# Patient Record
Sex: FEMALE | Race: WHITE | NOT HISPANIC OR LATINO | Employment: STUDENT | URBAN - METROPOLITAN AREA
[De-identification: names, ages, dates, MRNs, and addresses within clinical notes are randomized per-mention and may not be internally consistent; named-entity substitution may affect disease eponyms.]

---

## 2023-01-24 PROCEDURE — 0W9B30Z DRAINAGE OF LEFT PLEURAL CAVITY WITH DRAINAGE DEVICE, PERCUTANEOUS APPROACH: ICD-10-PCS | Performed by: SURGERY

## 2023-01-26 ENCOUNTER — APPOINTMENT (OUTPATIENT)
Dept: GENERAL RADIOLOGY | Facility: HOSPITAL | Age: 33
DRG: 201 | End: 2023-01-26
Payer: COMMERCIAL

## 2023-01-26 ENCOUNTER — HOSPITAL ENCOUNTER (INPATIENT)
Facility: HOSPITAL | Age: 33
LOS: 5 days | Discharge: HOME | DRG: 201 | End: 2023-01-31
Attending: EMERGENCY MEDICINE | Admitting: STUDENT IN AN ORGANIZED HEALTH CARE EDUCATION/TRAINING PROGRAM
Payer: COMMERCIAL

## 2023-01-26 DIAGNOSIS — J93.9 PNEUMOTHORAX, LEFT: Primary | ICD-10-CM

## 2023-01-26 LAB
ALBUMIN SERPL-MCNC: 4.6 G/DL (ref 3.5–5.2)
ALBUMIN/GLOB SERPL: 2 G/DL
ALP SERPL-CCNC: 57 U/L (ref 39–117)
ALT SERPL W P-5'-P-CCNC: 14 U/L (ref 1–33)
ANION GAP SERPL CALCULATED.3IONS-SCNC: 9.3 MMOL/L (ref 5–15)
AST SERPL-CCNC: 15 U/L (ref 1–32)
BASOPHILS # BLD AUTO: 0.03 10*3/MM3 (ref 0–0.2)
BASOPHILS NFR BLD AUTO: 0.5 % (ref 0–1.5)
BILIRUB SERPL-MCNC: 0.2 MG/DL (ref 0–1.2)
BUN SERPL-MCNC: 11 MG/DL (ref 6–20)
BUN/CREAT SERPL: 14.3 (ref 7–25)
CALCIUM SPEC-SCNC: 9.1 MG/DL (ref 8.6–10.5)
CHLORIDE SERPL-SCNC: 106 MMOL/L (ref 98–107)
CO2 SERPL-SCNC: 24.7 MMOL/L (ref 22–29)
CREAT SERPL-MCNC: 0.77 MG/DL (ref 0.57–1)
DEPRECATED RDW RBC AUTO: 39 FL (ref 37–54)
EGFRCR SERPLBLD CKD-EPI 2021: 104.6 ML/MIN/1.73
EOSINOPHIL # BLD AUTO: 0.05 10*3/MM3 (ref 0–0.4)
EOSINOPHIL NFR BLD AUTO: 0.9 % (ref 0.3–6.2)
ERYTHROCYTE [DISTWIDTH] IN BLOOD BY AUTOMATED COUNT: 11.5 % (ref 12.3–15.4)
GLOBULIN UR ELPH-MCNC: 2.3 GM/DL
GLUCOSE SERPL-MCNC: 82 MG/DL (ref 65–99)
HCT VFR BLD AUTO: 38.1 % (ref 34–46.6)
HGB BLD-MCNC: 13.2 G/DL (ref 12–15.9)
IMM GRANULOCYTES # BLD AUTO: 0.02 10*3/MM3 (ref 0–0.05)
IMM GRANULOCYTES NFR BLD AUTO: 0.3 % (ref 0–0.5)
LYMPHOCYTES # BLD AUTO: 1.56 10*3/MM3 (ref 0.7–3.1)
LYMPHOCYTES NFR BLD AUTO: 26.9 % (ref 19.6–45.3)
MCH RBC QN AUTO: 31.7 PG (ref 26.6–33)
MCHC RBC AUTO-ENTMCNC: 34.6 G/DL (ref 31.5–35.7)
MCV RBC AUTO: 91.6 FL (ref 79–97)
MONOCYTES # BLD AUTO: 0.59 10*3/MM3 (ref 0.1–0.9)
MONOCYTES NFR BLD AUTO: 10.2 % (ref 5–12)
NEUTROPHILS NFR BLD AUTO: 3.55 10*3/MM3 (ref 1.7–7)
NEUTROPHILS NFR BLD AUTO: 61.2 % (ref 42.7–76)
NRBC BLD AUTO-RTO: 0 /100 WBC (ref 0–0.2)
PLATELET # BLD AUTO: 280 10*3/MM3 (ref 140–450)
PMV BLD AUTO: 10.4 FL (ref 6–12)
POTASSIUM SERPL-SCNC: 4 MMOL/L (ref 3.5–5.2)
PROT SERPL-MCNC: 6.9 G/DL (ref 6–8.5)
RBC # BLD AUTO: 4.16 10*6/MM3 (ref 3.77–5.28)
SODIUM SERPL-SCNC: 140 MMOL/L (ref 136–145)
WBC NRBC COR # BLD: 5.8 10*3/MM3 (ref 3.4–10.8)

## 2023-01-26 PROCEDURE — G0378 HOSPITAL OBSERVATION PER HR: HCPCS

## 2023-01-26 PROCEDURE — 71046 X-RAY EXAM CHEST 2 VIEWS: CPT

## 2023-01-26 PROCEDURE — 99285 EMERGENCY DEPT VISIT HI MDM: CPT

## 2023-01-26 PROCEDURE — 25010000002 HYDROMORPHONE PER 4 MG: Performed by: EMERGENCY MEDICINE

## 2023-01-26 PROCEDURE — 71045 X-RAY EXAM CHEST 1 VIEW: CPT

## 2023-01-26 PROCEDURE — 25010000002 HYDROMORPHONE PER 4 MG: Performed by: NURSE PRACTITIONER

## 2023-01-26 PROCEDURE — 85025 COMPLETE CBC W/AUTO DIFF WBC: CPT | Performed by: NURSE PRACTITIONER

## 2023-01-26 PROCEDURE — 25010000002 MIDAZOLAM PER 1 MG: Performed by: NURSE PRACTITIONER

## 2023-01-26 PROCEDURE — 80053 COMPREHEN METABOLIC PANEL: CPT | Performed by: NURSE PRACTITIONER

## 2023-01-26 PROCEDURE — 25010000002 ONDANSETRON PER 1 MG: Performed by: NURSE PRACTITIONER

## 2023-01-26 RX ORDER — HYDROMORPHONE HYDROCHLORIDE 1 MG/ML
0.5 INJECTION, SOLUTION INTRAMUSCULAR; INTRAVENOUS; SUBCUTANEOUS ONCE
Status: COMPLETED | OUTPATIENT
Start: 2023-01-26 | End: 2023-01-26

## 2023-01-26 RX ORDER — HYDROMORPHONE HYDROCHLORIDE 1 MG/ML
0.25 INJECTION, SOLUTION INTRAMUSCULAR; INTRAVENOUS; SUBCUTANEOUS EVERY 4 HOURS PRN
Status: DISCONTINUED | OUTPATIENT
Start: 2023-01-26 | End: 2023-01-28

## 2023-01-26 RX ORDER — TRAZODONE HYDROCHLORIDE 50 MG/1
50 TABLET ORAL NIGHTLY
COMMUNITY
Start: 2022-11-23

## 2023-01-26 RX ORDER — TRAZODONE HYDROCHLORIDE 50 MG/1
50 TABLET ORAL NIGHTLY
Status: DISCONTINUED | OUTPATIENT
Start: 2023-01-26 | End: 2023-01-31 | Stop reason: HOSPADM

## 2023-01-26 RX ORDER — SODIUM CHLORIDE 0.9 % (FLUSH) 0.9 %
10 SYRINGE (ML) INJECTION AS NEEDED
Status: DISCONTINUED | OUTPATIENT
Start: 2023-01-26 | End: 2023-01-31 | Stop reason: HOSPADM

## 2023-01-26 RX ORDER — ONDANSETRON 2 MG/ML
4 INJECTION INTRAMUSCULAR; INTRAVENOUS ONCE
Status: COMPLETED | OUTPATIENT
Start: 2023-01-26 | End: 2023-01-26

## 2023-01-26 RX ORDER — MIDAZOLAM HYDROCHLORIDE 1 MG/ML
2 INJECTION INTRAMUSCULAR; INTRAVENOUS ONCE
Status: COMPLETED | OUTPATIENT
Start: 2023-01-26 | End: 2023-01-26

## 2023-01-26 RX ORDER — VILOXAZINE HYDROCHLORIDE 200 MG/1
300 CAPSULE, EXTENDED RELEASE ORAL DAILY
COMMUNITY
Start: 2023-01-05

## 2023-01-26 RX ADMIN — HYDROMORPHONE HYDROCHLORIDE 0.5 MG: 1 INJECTION, SOLUTION INTRAMUSCULAR; INTRAVENOUS; SUBCUTANEOUS at 20:25

## 2023-01-26 RX ADMIN — HYDROMORPHONE HYDROCHLORIDE 0.25 MG: 1 INJECTION, SOLUTION INTRAMUSCULAR; INTRAVENOUS; SUBCUTANEOUS at 23:47

## 2023-01-26 RX ADMIN — ONDANSETRON 4 MG: 2 INJECTION INTRAMUSCULAR; INTRAVENOUS at 20:25

## 2023-01-26 RX ADMIN — MIDAZOLAM 2 MG: 1 INJECTION INTRAMUSCULAR; INTRAVENOUS at 20:26

## 2023-01-26 RX ADMIN — SODIUM CHLORIDE 1000 ML: 9 INJECTION, SOLUTION INTRAVENOUS at 20:53

## 2023-01-26 RX ADMIN — HYDROMORPHONE HYDROCHLORIDE 0.5 MG: 1 INJECTION, SOLUTION INTRAMUSCULAR; INTRAVENOUS; SUBCUTANEOUS at 21:21

## 2023-01-27 ENCOUNTER — APPOINTMENT (OUTPATIENT)
Dept: GENERAL RADIOLOGY | Facility: HOSPITAL | Age: 33
DRG: 201 | End: 2023-01-27
Payer: COMMERCIAL

## 2023-01-27 PROBLEM — R07.81 PLEURITIC CHEST PAIN: Status: ACTIVE | Noted: 2023-01-27

## 2023-01-27 PROBLEM — G47.00 INSOMNIA: Status: ACTIVE | Noted: 2023-01-27

## 2023-01-27 PROCEDURE — 71045 X-RAY EXAM CHEST 1 VIEW: CPT

## 2023-01-27 PROCEDURE — G0378 HOSPITAL OBSERVATION PER HR: HCPCS

## 2023-01-27 PROCEDURE — 25010000002 HYDROMORPHONE 1 MG/ML SOLUTION: Performed by: NURSE PRACTITIONER

## 2023-01-27 PROCEDURE — 99253 IP/OBS CNSLTJ NEW/EST LOW 45: CPT | Performed by: NURSE PRACTITIONER

## 2023-01-27 PROCEDURE — 25010000002 ENOXAPARIN PER 10 MG: Performed by: INTERNAL MEDICINE

## 2023-01-27 RX ORDER — NITROGLYCERIN 0.4 MG/1
0.4 TABLET SUBLINGUAL
Status: DISCONTINUED | OUTPATIENT
Start: 2023-01-27 | End: 2023-01-31 | Stop reason: HOSPADM

## 2023-01-27 RX ORDER — ONDANSETRON 2 MG/ML
4 INJECTION INTRAMUSCULAR; INTRAVENOUS EVERY 6 HOURS PRN
Status: DISCONTINUED | OUTPATIENT
Start: 2023-01-27 | End: 2023-01-31 | Stop reason: HOSPADM

## 2023-01-27 RX ORDER — SODIUM CHLORIDE 9 MG/ML
40 INJECTION, SOLUTION INTRAVENOUS AS NEEDED
Status: DISCONTINUED | OUTPATIENT
Start: 2023-01-27 | End: 2023-01-31 | Stop reason: HOSPADM

## 2023-01-27 RX ORDER — SODIUM CHLORIDE 0.9 % (FLUSH) 0.9 %
10 SYRINGE (ML) INJECTION EVERY 12 HOURS SCHEDULED
Status: DISCONTINUED | OUTPATIENT
Start: 2023-01-27 | End: 2023-01-31 | Stop reason: HOSPADM

## 2023-01-27 RX ORDER — ENOXAPARIN SODIUM 100 MG/ML
40 INJECTION SUBCUTANEOUS EVERY 24 HOURS
Status: DISCONTINUED | OUTPATIENT
Start: 2023-01-27 | End: 2023-01-31 | Stop reason: HOSPADM

## 2023-01-27 RX ORDER — OXYCODONE AND ACETAMINOPHEN 7.5; 325 MG/1; MG/1
1 TABLET ORAL EVERY 4 HOURS PRN
Status: DISCONTINUED | OUTPATIENT
Start: 2023-01-27 | End: 2023-01-28

## 2023-01-27 RX ORDER — ACETAMINOPHEN 160 MG/5ML
650 SOLUTION ORAL EVERY 4 HOURS PRN
Status: DISCONTINUED | OUTPATIENT
Start: 2023-01-27 | End: 2023-01-28

## 2023-01-27 RX ORDER — ONDANSETRON 4 MG/1
4 TABLET, FILM COATED ORAL EVERY 6 HOURS PRN
Status: DISCONTINUED | OUTPATIENT
Start: 2023-01-27 | End: 2023-01-31 | Stop reason: HOSPADM

## 2023-01-27 RX ORDER — SODIUM CHLORIDE 0.9 % (FLUSH) 0.9 %
10 SYRINGE (ML) INJECTION AS NEEDED
Status: DISCONTINUED | OUTPATIENT
Start: 2023-01-27 | End: 2023-01-31 | Stop reason: HOSPADM

## 2023-01-27 RX ORDER — ACETAMINOPHEN 325 MG/1
650 TABLET ORAL EVERY 4 HOURS PRN
Status: DISCONTINUED | OUTPATIENT
Start: 2023-01-27 | End: 2023-01-28

## 2023-01-27 RX ORDER — ACETAMINOPHEN 325 MG/1
650 TABLET ORAL EVERY 4 HOURS PRN
Start: 2023-01-27

## 2023-01-27 RX ORDER — ACETAMINOPHEN 650 MG/1
650 SUPPOSITORY RECTAL EVERY 4 HOURS PRN
Status: DISCONTINUED | OUTPATIENT
Start: 2023-01-27 | End: 2023-01-28

## 2023-01-27 RX ADMIN — Medication 10 ML: at 08:16

## 2023-01-27 RX ADMIN — OXYCODONE HYDROCHLORIDE AND ACETAMINOPHEN 1 TABLET: 7.5; 325 TABLET ORAL at 08:15

## 2023-01-27 RX ADMIN — HYDROMORPHONE HYDROCHLORIDE 0.25 MG: 1 INJECTION, SOLUTION INTRAMUSCULAR; INTRAVENOUS; SUBCUTANEOUS at 03:40

## 2023-01-27 RX ADMIN — Medication 10 ML: at 20:01

## 2023-01-27 RX ADMIN — OXYCODONE HYDROCHLORIDE AND ACETAMINOPHEN 1 TABLET: 7.5; 325 TABLET ORAL at 22:41

## 2023-01-27 RX ADMIN — TRAZODONE HYDROCHLORIDE 50 MG: 50 TABLET ORAL at 20:00

## 2023-01-27 RX ADMIN — ENOXAPARIN SODIUM 40 MG: 100 INJECTION SUBCUTANEOUS at 19:59

## 2023-01-27 NOTE — PLAN OF CARE
Problem: Adult Inpatient Plan of Care  Goal: Plan of Care Review  Outcome: Ongoing, Progressing  Flowsheets (Taken 1/27/2023 0445)  Outcome Evaluation: VSS. AO x 4. CT to -20 sx. Pain controlled with PRN IV meds. NSR  Goal: Patient-Specific Goal (Individualized)  Outcome: Ongoing, Progressing  Goal: Absence of Hospital-Acquired Illness or Injury  Outcome: Ongoing, Progressing  Intervention: Identify and Manage Fall Risk  Recent Flowsheet Documentation  Taken 1/26/2023 2354 by Renuka Martinez, RN  Safety Promotion/Fall Prevention:   activity supervised   assistive device/personal items within reach   clutter free environment maintained   fall prevention program maintained   lighting adjusted   mobility aid in reach   nonskid shoes/slippers when out of bed   safety round/check completed   toileting scheduled  Taken 1/26/2023 2254 by Renuka Martinez, RN  Safety Promotion/Fall Prevention:   activity supervised   clutter free environment maintained   assistive device/personal items within reach   fall prevention program maintained   mobility aid in reach   lighting adjusted   nonskid shoes/slippers when out of bed   safety round/check completed   toileting scheduled  Intervention: Prevent and Manage VTE (Venous Thromboembolism) Risk  Recent Flowsheet Documentation  Taken 1/26/2023 2354 by Renuka Martinez, RN  Activity Management:   activity adjusted per tolerance   activity encouraged  Taken 1/26/2023 2254 by Renuka Martinez, RN  Activity Management:   activity adjusted per tolerance   activity encouraged  Goal: Optimal Comfort and Wellbeing  Outcome: Ongoing, Progressing  Intervention: Monitor Pain and Promote Comfort  Recent Flowsheet Documentation  Taken 1/26/2023 2254 by Renuka Martinez, RN  Pain Management Interventions:   position adjusted   pillow support provided  Goal: Readiness for Transition of Care  Outcome: Ongoing, Progressing  Intervention: Mutually Develop Transition  Plan  Recent Flowsheet Documentation  Taken 1/26/2023 2247 by Renuka Martinez, RN  Equipment Currently Used at Home: none  Taken 1/26/2023 2245 by Renuka Martinez, RN  Transportation Anticipated: family or friend will provide  Patient/Family Anticipated Services at Transition: none  Patient/Family Anticipates Transition to: home with family   Goal Outcome Evaluation:              Outcome Evaluation: VSS. AO x 4. CT to -20 sx. Pain controlled with PRN IV meds. NSR

## 2023-01-27 NOTE — CONSULTS
Inpatient Thoracic Surgery Consult  Consult performed by: Faiza Valdovinos, TONA, APRN  Consult ordered by: Tamera Moffett APRN          Patient Care Team:  Provider, No Known as PCP - General  Provider, No Known as PCP - Family Medicine    Chief Complaint   Patient presents with   • Back Pain       Subjective     History of Present Illness    Ms. Sapp is a pleasant 32-year-old lady without a significant past medical history.  She presented to [facility] ER yesterday with complaints of left-sided back pain and shortness of breath after acupuncture earlier that day.  Initial imaging demonstrated left-sided pneumothorax and a chest tube was placed in the ER.  Thoracic surgery was consulted for management.      Review of Systems   Constitutional: Negative.    HENT: Negative.    Eyes: Negative.    Respiratory: Positive for shortness of breath.    Cardiovascular: Positive for chest pain.   Endocrine: Negative.    Genitourinary: Negative.    Musculoskeletal: Positive for back pain.   Skin: Negative.    Allergic/Immunologic: Negative.    Neurological: Negative.    Hematological: Negative.    Psychiatric/Behavioral: Negative.         Patient Active Problem List   Diagnosis   • Pneumothorax, left   • Insomnia   • Pleuritic chest pain     Past Medical History:   Diagnosis Date   • Kyphosis      Past Surgical History:   Procedure Laterality Date   • CYST REMOVAL      uterine cyst     History reviewed. No pertinent family history.  Social History     Socioeconomic History   • Marital status: Single   Tobacco Use   • Smoking status: Never   Vaping Use   • Vaping Use: Never used   Substance and Sexual Activity   • Alcohol use: Never   • Drug use: Never     Medications Prior to Admission   Medication Sig Dispense Refill Last Dose   • Qelbree 200 MG capsule sustained-release 24 hr Take 200 mg by mouth Daily.      • traZODone (DESYREL) 50 MG tablet Take 50 mg by mouth Every Night.        No Known  Allergies    Objective      Vital Signs  Temp:  [98.1 °F (36.7 °C)-100 °F (37.8 °C)] 98.1 °F (36.7 °C)  Heart Rate:  [] 97  Resp:  [14-18] 16  BP: ()/(58-85) 90/59    Intake & Output (last day)       01/26 0701  01/27 0700 01/27 0701  01/28 0700          Urine Unmeasured Occurrence 1 x 1 x          Physical Exam  Constitutional:       General: She is not in acute distress.     Appearance: Normal appearance.   HENT:      Head: Normocephalic and atraumatic.   Cardiovascular:      Rate and Rhythm: Normal rate.      Pulses: Normal pulses.   Pulmonary:      Effort: Pulmonary effort is normal. No respiratory distress.   Chest:      Chest wall: Tenderness (left chest tube in place) present.   Musculoskeletal:         General: Normal range of motion.      Cervical back: Normal range of motion and neck supple.   Skin:     General: Skin is warm and dry.   Neurological:      General: No focal deficit present.      Mental Status: She is alert and oriented to person, place, and time.   Psychiatric:         Mood and Affect: Mood normal.         Behavior: Behavior normal.         Results Review:    I reviewed the patient's new clinical results.  I reviewed the patient's new imaging results and agree with the interpretation.  I reviewed the patient's other test results and agree with the interpretation  Discussed with patient, RN and Dr. Villar    Imaging Results (Last 24 Hours)     Procedure Component Value Units Date/Time    XR Chest 1 View [[id number]] Resulted: 01/27/23 1208     Updated: 01/27/23 1249    XR Chest 1 View [[id number]] Collected: 01/27/23 1013     Updated: 01/27/23 1019    Narrative:      XR CHEST 1 VW-     Clinical: Follow-up left-sided pneumothorax     COMPARISON examination 1/26/2023 at 2056 hours     FINDINGS: Small bore left-sided chest tube is again demonstrated, the  tip has shifted, now directed inferiorly. The left apical pneumothorax  is larger compared to the previous examination. Approximately  6-8% at  this time.     The cardiomediastinal silhouette is stable, the right lung is clear. No  acute airspace disease has developed. The remainder is unremarkable.     This report was finalized on 1/27/2023 10:16 AM by Dr. Levon Cole M.D.       XR Chest 1 View [[id number]] Collected: 01/26/23 2121     Updated: 01/26/23 2126    Narrative:      SINGLE VIEW OF THE CHEST     HISTORY: Chest tube placement     COMPARISON: 01/26/2023     FINDINGS:  Left-sided pleural drainage catheter has been placed, with subsequent  reexpansion of the left lung. There is a residual tiny left apical  pneumothorax. Heart size is within normal limits. Right lung appears  clear.       Impression:      Interval placement of a left-sided pleural drainage catheter. Residual  tiny left apical pneumothorax noted.     This report was finalized on 1/26/2023 9:23 PM by Dr. Jacinda Hamlin M.D.       XR Chest 2 View [[id number]] Collected: 01/26/23 1922     Updated: 01/26/23 1929    Narrative:      XR CHEST 2 VW-     Clinical: Chest and back pain 32-year-old female     FINDINGS: There is a left-sided pneumothorax, estimated size 20%. There  is an apical and basilar component. No significant mediastinal shift.  Heart size within normal limits. No mediastinal or hilar mass. The right  lung is clear.     No pleural effusion nor vascular congestion seen.     CONCLUSION: Left-sided pneumothorax, estimated size 20%.     FINDINGS called Dr. Garza at the time of completion 7:30 PM.     This report was finalized on 1/26/2023 7:26 PM by Dr. Levon Cole M.D.             Lab Results:  Lab Results (last 24 hours)     Procedure Component Value Units Date/Time    Comprehensive Metabolic Panel [[id number]] Collected: 01/26/23 1958    Specimen: Blood Updated: 01/26/23 2057     Glucose 82 mg/dL      BUN 11 mg/dL      Creatinine 0.77 mg/dL      Sodium 140 mmol/L      Potassium 4.0 mmol/L      Comment: Slight hemolysis detected by analyzer. Results may be  affected.        Chloride 106 mmol/L      CO2 24.7 mmol/L      Calcium 9.1 mg/dL      Total Protein 6.9 g/dL      Albumin 4.6 g/dL      ALT (SGPT) 14 U/L      AST (SGOT) 15 U/L      Comment: Slight hemolysis detected by analyzer. Results may be affected.        Alkaline Phosphatase 57 U/L      Total Bilirubin 0.2 mg/dL      Globulin 2.3 gm/dL      A/G Ratio 2.0 g/dL      BUN/Creatinine Ratio 14.3     Anion Gap 9.3 mmol/L      eGFR 104.6 mL/min/1.73     Narrative:      GFR Normal >60  Chronic Kidney Disease <60  Kidney Failure <15      CBC & Differential [[id number]]  (Abnormal) Collected: 01/26/23 1958    Specimen: Blood Updated: 01/26/23 2022    Narrative:      The following orders were created for panel order CBC & Differential.  Procedure                               Abnormality         Status                     ---------                               -----------         ------                     CBC Auto Differential[[id number]]        Abnormal            Final result                 Please view results for these tests on the individual orders.    CBC Auto Differential [[id number]]  (Abnormal) Collected: 01/26/23 1958    Specimen: Blood Updated: 01/26/23 2022     WBC 5.80 10*3/mm3      RBC 4.16 10*6/mm3      Hemoglobin 13.2 g/dL      Hematocrit 38.1 %      MCV 91.6 fL      MCH 31.7 pg      MCHC 34.6 g/dL      RDW 11.5 %      RDW-SD 39.0 fl      MPV 10.4 fL      Platelets 280 10*3/mm3      Neutrophil % 61.2 %      Lymphocyte % 26.9 %      Monocyte % 10.2 %      Eosinophil % 0.9 %      Basophil % 0.5 %      Immature Grans % 0.3 %      Neutrophils, Absolute 3.55 10*3/mm3      Lymphocytes, Absolute 1.56 10*3/mm3      Monocytes, Absolute 0.59 10*3/mm3      Eosinophils, Absolute 0.05 10*3/mm3      Basophils, Absolute 0.03 10*3/mm3      Immature Grans, Absolute 0.02 10*3/mm3      nRBC 0.0 /100 WBC               Assessment & Plan       Pneumothorax, left    Insomnia    Pleuritic chest pain      Assessment & Plan    I  have independently reviewed the patient's radiographic imaging including chest x-ray performed pre and post chest tube placement.  Left-sided pneumothorax appears to have resolved once chest tube was placed.    Left pneumothorax: Patient received acupuncture earlier today was found to have pneumothorax on admission.  This appears resolved with chest tube placement.  No airleak with forceful cough.  Chest tube was removed at the bedside today without difficulty.  Dry DuoDERM applied to former chest tube site.  Patient may shower this evening and remove DuoDERM after 48 hours.  We will check a follow-up image at 12 noon.  Patient should be stable for discharge as long as pneumothorax has not recurred on imaging.  She may follow-up with us as needed in our clinic.  Thank you for letting us precipitate in the care of Ms. Sapp      I discussed the patients findings and our recommendations with patient, nursing staff and Dr. Villar as well as family at bedside    Thank you for this consult and allowing us to participate in the care of your patient.       Faiza Valdovinos DNP, APRN  [facility]  01/27/23  12:49 EST      I spent >45 minutes reviewing the patient's chart including medical history, notes, radiographic imaging, labs and performing an assessment and development of a plan and discussion with the patient/family at bedside.      ADDENDUM: Follow-up x-ray reviewed status post chest tube removal.  Persistent pleural separation.  Recommend to observe 1 more night and recheck a chest x-ray in the morning.

## 2023-01-27 NOTE — H&P
Patient Name:  Lena Sapp  YOB: 1990  MRN:  [id number]  Admit Date:  1/26/2023  Patient Care Team:  Provider, No Known as PCP - General  Provider, No Known as PCP - Family Medicine      Subjective   History Present Illness     Chief Complaint   Patient presents with   • Back Pain         This is a 32-year-old woman with a past medical history of ADHD and mood disorder comes to the hospital after experiencing dyspnea after undergoing acupuncture this morning.  Into the hospital for further evaluation management where an x-ray revealed a left-sided pneumothorax with an apical and basilar component.  She had a chest tube placed on the ER and was subsequently admitted.  Laboratory work-up did not reveal any abnormalities.  Denies any fevers, chills, nausea, vomiting, chest pain.        Review of Systems   Constitutional: Negative for chills and fever.   HENT: Negative for dental problem and ear pain.    Respiratory: Positive for shortness of breath. Negative for wheezing.    Cardiovascular: Negative for chest pain and palpitations.   Gastrointestinal: Negative for abdominal pain and blood in stool.   Endocrine: Negative for cold intolerance and polydipsia.   Musculoskeletal: Negative for back pain and joint swelling.   Skin: Negative for color change and rash.   Neurological: Negative for weakness and headaches.   Psychiatric/Behavioral: Negative for agitation and decreased concentration.        Personal History     Past Medical History:   Diagnosis Date   • Kyphosis      Past Surgical History:   Procedure Laterality Date   • CYST REMOVAL      uterine cyst     History reviewed. No pertinent family history.  Social History     Tobacco Use   • Smoking status: Never   Vaping Use   • Vaping Use: Never used   Substance Use Topics   • Alcohol use: Never   • Drug use: Never     No current facility-administered medications on file prior to encounter.     Current Outpatient Medications on File Prior to  Encounter   Medication Sig Dispense Refill   • Qelbree 200 MG capsule sustained-release 24 hr Take 200 mg by mouth Daily.     • traZODone (DESYREL) 50 MG tablet Take 50 mg by mouth Every Night.       No Known Allergies    Objective    Objective     Vital Signs  Temp:  [100 °F (37.8 °C)] 100 °F (37.8 °C)  Heart Rate:  [] 92  Resp:  [14-18] 16  BP: ()/(58-85) 104/78  SpO2:  [99 %-100 %] 100 %  on   ;   Device (Oxygen Therapy): room air  Body mass index is 19.97 kg/m².    Physical Exam  Constitutional:       Appearance: Normal appearance.   HENT:      Head: Normocephalic and atraumatic.   Eyes:      Extraocular Movements: Extraocular movements intact.      Pupils: Pupils are equal, round, and reactive to light.   Cardiovascular:      Rate and Rhythm: Normal rate and regular rhythm.   Pulmonary:      Effort: Pulmonary effort is normal. No respiratory distress.      Comments: Chest tube noted   Abdominal:      General: There is no distension.      Palpations: Abdomen is soft.      Tenderness: There is no abdominal tenderness.   Musculoskeletal:      Right lower leg: No edema.      Left lower leg: No edema.   Skin:     General: Skin is warm and dry.   Neurological:      General: No focal deficit present.      Mental Status: She is alert and oriented to person, place, and time.         Results Review:  I reviewed the patient's new clinical results.  I reviewed the patient's new imaging results and agree with the interpretation.  I reviewed the patient's other test results and agree with the interpretation  I personally viewed and interpreted the patient's EKG/Telemetry data  Discussed with ED provider.    Lab Results (last 24 hours)     Procedure Component Value Units Date/Time    CBC & Differential [[id number]]  (Abnormal) Collected: 01/26/23 1958    Specimen: Blood Updated: 01/26/23 2022    Narrative:      The following orders were created for panel order CBC & Differential.  Procedure                                Abnormality         Status                     ---------                               -----------         ------                     CBC Auto Differential[[id number]]        Abnormal            Final result                 Please view results for these tests on the individual orders.    Comprehensive Metabolic Panel [[id number]] Collected: 01/26/23 1958    Specimen: Blood Updated: 01/26/23 2057     Glucose 82 mg/dL      BUN 11 mg/dL      Creatinine 0.77 mg/dL      Sodium 140 mmol/L      Potassium 4.0 mmol/L      Comment: Slight hemolysis detected by analyzer. Results may be affected.        Chloride 106 mmol/L      CO2 24.7 mmol/L      Calcium 9.1 mg/dL      Total Protein 6.9 g/dL      Albumin 4.6 g/dL      ALT (SGPT) 14 U/L      AST (SGOT) 15 U/L      Comment: Slight hemolysis detected by analyzer. Results may be affected.        Alkaline Phosphatase 57 U/L      Total Bilirubin 0.2 mg/dL      Globulin 2.3 gm/dL      A/G Ratio 2.0 g/dL      BUN/Creatinine Ratio 14.3     Anion Gap 9.3 mmol/L      eGFR 104.6 mL/min/1.73     Narrative:      GFR Normal >60  Chronic Kidney Disease <60  Kidney Failure <15      CBC Auto Differential [[id number]]  (Abnormal) Collected: 01/26/23 1958    Specimen: Blood Updated: 01/26/23 2022     WBC 5.80 10*3/mm3      RBC 4.16 10*6/mm3      Hemoglobin 13.2 g/dL      Hematocrit 38.1 %      MCV 91.6 fL      MCH 31.7 pg      MCHC 34.6 g/dL      RDW 11.5 %      RDW-SD 39.0 fl      MPV 10.4 fL      Platelets 280 10*3/mm3      Neutrophil % 61.2 %      Lymphocyte % 26.9 %      Monocyte % 10.2 %      Eosinophil % 0.9 %      Basophil % 0.5 %      Immature Grans % 0.3 %      Neutrophils, Absolute 3.55 10*3/mm3      Lymphocytes, Absolute 1.56 10*3/mm3      Monocytes, Absolute 0.59 10*3/mm3      Eosinophils, Absolute 0.05 10*3/mm3      Basophils, Absolute 0.03 10*3/mm3      Immature Grans, Absolute 0.02 10*3/mm3      nRBC 0.0 /100 WBC           Imaging Results (Last 24 Hours)     Procedure Component  Value Units Date/Time    XR Chest 1 View [[id number]] Collected: 01/26/23 2121     Updated: 01/26/23 2126    Narrative:      SINGLE VIEW OF THE CHEST     HISTORY: Chest tube placement     COMPARISON: 01/26/2023     FINDINGS:  Left-sided pleural drainage catheter has been placed, with subsequent  reexpansion of the left lung. There is a residual tiny left apical  pneumothorax. Heart size is within normal limits. Right lung appears  clear.       Impression:      Interval placement of a left-sided pleural drainage catheter. Residual  tiny left apical pneumothorax noted.     This report was finalized on 1/26/2023 9:23 PM by Dr. Jacinda Hamlin M.D.       XR Chest 2 View [[id number]] Collected: 01/26/23 1922     Updated: 01/26/23 1929    Narrative:      XR CHEST 2 VW-     Clinical: Chest and back pain 32-year-old female     FINDINGS: There is a left-sided pneumothorax, estimated size 20%. There  is an apical and basilar component. No significant mediastinal shift.  Heart size within normal limits. No mediastinal or hilar mass. The right  lung is clear.     No pleural effusion nor vascular congestion seen.     CONCLUSION: Left-sided pneumothorax, estimated size 20%.     FINDINGS called Dr. Garza at the time of completion 7:30 PM.     This report was finalized on 1/26/2023 7:26 PM by Dr. Levon Cole M.D.                 No orders to display        Assessment/Plan     Active Hospital Problems    Diagnosis  POA   • **Pneumothorax, left [J93.9]  Yes      Resolved Hospital Problems   No resolved problems to display.       32-year-old woman who came to the hospital after experiencing dyspnea after acupuncture.  Diagnosed with pneumothorax and had chest tube placed.    Left-sided pneumothorax  Chest tube placed  Thoracic surgery consulted  Monitor oxygenation    Insomnia  Resume home trazodone       I discussed the patient's findings and my recommendations with patient and ED provider.    VTE Prophylaxis - SCDs.  Code Status  - Full code.       Napoleon Conteh MD  [facility]  01/26/23  22:04 EST

## 2023-01-27 NOTE — ED PROVIDER NOTES
EMERGENCY DEPARTMENT ENCOUNTER    Room Number:  05/05  Date of encounter:  1/26/2023  PCP: Provider, No Known  Patient Care Team:  Provider, No Known as PCP - General  Provider, No Known as PCP - Family Medicine   Independent Historians: Patient        PPE: Patient was placed in face mask in first look. Patient was wearing facemask when I entered the room and throughout our encounter. I wore full protective equipment throughout this patient encounter including a face mask, and gloves. Hand hygiene was performed before donning protective equipment and after removal when leaving the room.        HPI:  Chief Complaint: Left-sided back pain  A complete HPI/ROS/PMH/PSH/SH/FH are unobtainable due to: Nothing    Chronic or social conditions impacting patient care (social determinants of health): None    Context: Lena Sapp is a 32 y.o. female who arrives to the ED via private vehicle.  Patient presents with c/o left posterior mid back pain that started today after having acupuncture around 10 AM.  Patient states that driving home she noticed the pain, it progressed throughout the day.  Patient states this was the first time she had had acupuncture.  Patient also complains of shortness of breath, coughing.  Patient denies fever, chills, nausea, vomiting, dizziness, weakness.  Patient states that nothing makes the symptoms better and taking a deep breath worsens symptoms.  Last menstrual period 3 weeks ago    Review of prior external notes (non-ED): Medical records reviewed in [facility], patient last saw primary care 1/5/2021 for depression.    Review of prior external test results outside of this encounter: Patient had a COVID-19 test 6/16/2022 that was negative    PAST MEDICAL HISTORY  Active Ambulatory Problems     Diagnosis Date Noted   • No Active Ambulatory Problems     Resolved Ambulatory Problems     Diagnosis Date Noted   • No Resolved Ambulatory Problems     No Additional Past Medical History       The patient  has started, but not completed, their COVID-19 vaccination series.    PAST SURGICAL HISTORY  No past surgical history on file.      FAMILY HISTORY  No family history on file.      SOCIAL HISTORY  Social History     Socioeconomic History   • Marital status: Unknown         ALLERGIES  Patient has no known allergies.        REVIEW OF SYSTEMS  Review of Systems     All systems reviewed and negative except for those discussed in HPI.       PHYSICAL EXAM    I have reviewed the triage vital signs and nursing notes.    ED Triage Vitals [01/26/23 1906]   Temp Heart Rate Resp BP SpO2   100 °F (37.8 °C) 100 18 105/80 99 %       Physical Exam  GENERAL: Well appearing, nontoxic appearing, not distressed  HENT: normocephalic, atraumatic  EYES: no scleral icterus, PERRL  CV: regular rhythm, tachycardic, no murmur  RESPIRATORY: normal effort, diminished breath sounds on the left  ABDOMEN: soft, nontender  MUSCULOSKELETAL: no deformity  NEURO: alert, moves all extremities, follows commands, mental status normal/baseline  SKIN: warm, dry, no rash   Psych: Appropriate mood and affect  Nursing notes and vital signs reviewed          LAB RESULTS  No results found for this or any previous visit (from the past 24 hour(s)).    Ordered the above labs and independently reviewed the results.        RADIOLOGY  XR Chest 2 View    Result Date: 1/26/2023  XR CHEST 2 VW-  Clinical: Chest and back pain 32-year-old female  FINDINGS: There is a left-sided pneumothorax, estimated size 20%. There is an apical and basilar component. No significant mediastinal shift. Heart size within normal limits. No mediastinal or hilar mass. The right lung is clear.  No pleural effusion nor vascular congestion seen.  CONCLUSION: Left-sided pneumothorax, estimated size 20%.  FINDINGS called Dr. Garza at the time of completion 7:30 PM.  This report was finalized on 1/26/2023 7:26 PM by Dr. Levon Cole M.D.        I ordered the above noted radiological studies.  Reviewed by me and discussed with radiologist.  See dictation for official radiology interpretation.      PROCEDURES    Critical Care  Performed by: Tamera Moffett APRN  Authorized by: Manny Velasquez II, MD     Critical care provider statement:     Critical care time (minutes):  40    Critical care was necessary to treat or prevent imminent or life-threatening deterioration of the following conditions: left pneumothorax.    Critical care was time spent personally by me on the following activities:  Ordering and performing treatments and interventions, ordering and review of laboratory studies, development of treatment plan with patient or surrogate, discussions with consultants, ordering and review of radiographic studies, examination of patient, review of old charts and obtaining history from patient or surrogate        DIFFERENTIAL DIAGNOSIS:  Differential diagnosis include but are not limited to the following: Musculoskeletal pain, pneumothorax, pleurisy    PROGRESS, DATA ANALYSIS, CONSULTS, AND MEDICAL DECISION MAKING    All labs have been independently reviewed by me.  All radiology studies have been reviewed by me and discussed with radiologist dictating the report.   EKG's independently viewed and interpreted by me.  Discussion below represents my analysis of pertinent findings related to patient's condition, differential diagnosis, treatment plan and final disposition.        ED Course as of 01/26/23 2220 Thu Jan 26, 2023 1949 Patient is a well-appearing 32-year-old who presents today with left back pain and shortness of breath after having acupuncture done around 10 AM this morning.  Discussed with patient that her chest x-ray does show that she has a left-sided pneumothorax.  Call has been placed to thoracic surgery. [MS]   1949 I viewed the patient's chest imaging in PACS.  My interpretation is left-sided pneumothorax.  See dictation for official radiology interpretation.     [MS]    1950 Reviewed pt's history and workup with Dr. Velasquez.  After a bedside evaluation, he agrees with the plan of care.     [MS]   2004 Discussed with Dr Gardner, thoracic surgery, regarding patient's relevant history, exam, workup and ED findings/concerns.  He agrees to see patient in the am, would like for patient to get chest tube tonight.     [MS]   2051 Dr Velasquez placed a chest tube, patient tolerated well, repeat chest xray ordered for chest tube placement. [MS]   2104 Consult Note    Discussed care with Dr Gipson  Reviewed patient's history, exam, results and need for admission secondary to left pneumothorax  Dr. Gipson accepts the patient to be admitted to telemetry observation bed.     [MS]      ED Course User Index  [MS] Tamera Moffett, APRN         DISPOSITION  ED Disposition     ED Disposition   Decision to Admit    Condition   --    Comment   Level of Care: Telemetry [5]   Diagnosis: Pneumothorax, left [086765]   Admitting Physician: DELORES GIPSON [[id number]]   Attending Physician: DELORES GIPSON [[id number]]               ADMISSION    Discussed treatment plan and reason for admission with pt/family and admitting physician.  Pt/family voiced understanding of the plan for admission for further testing/treatment as needed.      DIAGNOSIS  Final diagnoses:   Pneumothorax, left       AS OF 19:46 EST VITALS:    BP - 101/68  HR - 100  TEMP - 100 °F (37.8 °C) (Tympanic)  O2 SATS - 100%      MEDICATIONS GIVEN IN ER    Medications   sodium chloride 0.9 % flush 10 mL (has no administration in time range)                Note Disclaimer: At [facility], we believe that sharing information builds trust and better relationships. You are receiving this note because you recently visited [facility]. It is possible you will see health information before a provider has talked with you about it. This kind of information can be easy to misunderstand. To help you fully understand what it means for your health, we urge you to  discuss this note with your provider.       Tamera Moffett, APRN  01/26/23 8508

## 2023-01-27 NOTE — ED PROVIDER NOTES
MD ATTESTATION NOTE    The GIOVANNI and I have discussed this patient's history, physical exam, and treatment plan.    I provided a substantive portion of the care of this patient. I personally performed the physical exam, in its entirety. The attached note describes my personal findings.      Lena Sapp is a 32 y.o. female who presents to the ED c/o left-sided chest pain and shortness of breath that began while getting in acupuncture today for the first time around 10 AM.  Pain has been progressing throughout the day.      On exam:  GENERAL: not distressed  HENT: nares patent  EYES: no scleral icterus  CV: regular rhythm, regular rate  RESPIRATORY: normal effort, bilateral breath sounds although it is diminished in the left  ABDOMEN: soft, nontender  MUSCULOSKELETAL: no deformity  NEURO: alert, moves all extremities, follows commands  SKIN: warm, dry    Labs  Recent Results (from the past 24 hour(s))   Comprehensive Metabolic Panel    Collection Time: 01/26/23  7:58 PM    Specimen: Blood   Result Value Ref Range    Glucose 82 65 - 99 mg/dL    BUN 11 6 - 20 mg/dL    Creatinine 0.77 0.57 - 1.00 mg/dL    Sodium 140 136 - 145 mmol/L    Potassium 4.0 3.5 - 5.2 mmol/L    Chloride 106 98 - 107 mmol/L    CO2 24.7 22.0 - 29.0 mmol/L    Calcium 9.1 8.6 - 10.5 mg/dL    Total Protein 6.9 6.0 - 8.5 g/dL    Albumin 4.6 3.5 - 5.2 g/dL    ALT (SGPT) 14 1 - 33 U/L    AST (SGOT) 15 1 - 32 U/L    Alkaline Phosphatase 57 39 - 117 U/L    Total Bilirubin 0.2 0.0 - 1.2 mg/dL    Globulin 2.3 gm/dL    A/G Ratio 2.0 g/dL    BUN/Creatinine Ratio 14.3 7.0 - 25.0    Anion Gap 9.3 5.0 - 15.0 mmol/L    eGFR 104.6 >60.0 mL/min/1.73   CBC Auto Differential    Collection Time: 01/26/23  7:58 PM    Specimen: Blood   Result Value Ref Range    WBC 5.80 3.40 - 10.80 10*3/mm3    RBC 4.16 3.77 - 5.28 10*6/mm3    Hemoglobin 13.2 12.0 - 15.9 g/dL    Hematocrit 38.1 34.0 - 46.6 %    MCV 91.6 79.0 - 97.0 fL    MCH 31.7 26.6 - 33.0 pg    MCHC 34.6 31.5 -  35.7 g/dL    RDW 11.5 (L) 12.3 - 15.4 %    RDW-SD 39.0 37.0 - 54.0 fl    MPV 10.4 6.0 - 12.0 fL    Platelets 280 140 - 450 10*3/mm3    Neutrophil % 61.2 42.7 - 76.0 %    Lymphocyte % 26.9 19.6 - 45.3 %    Monocyte % 10.2 5.0 - 12.0 %    Eosinophil % 0.9 0.3 - 6.2 %    Basophil % 0.5 0.0 - 1.5 %    Immature Grans % 0.3 0.0 - 0.5 %    Neutrophils, Absolute 3.55 1.70 - 7.00 10*3/mm3    Lymphocytes, Absolute 1.56 0.70 - 3.10 10*3/mm3    Monocytes, Absolute 0.59 0.10 - 0.90 10*3/mm3    Eosinophils, Absolute 0.05 0.00 - 0.40 10*3/mm3    Basophils, Absolute 0.03 0.00 - 0.20 10*3/mm3    Immature Grans, Absolute 0.02 0.00 - 0.05 10*3/mm3    nRBC 0.0 0.0 - 0.2 /100 WBC       Radiology  XR Chest 2 View    Result Date: 1/26/2023  XR CHEST 2 VW-  Clinical: Chest and back pain 32-year-old female  FINDINGS: There is a left-sided pneumothorax, estimated size 20%. There is an apical and basilar component. No significant mediastinal shift. Heart size within normal limits. No mediastinal or hilar mass. The right lung is clear.  No pleural effusion nor vascular congestion seen.  CONCLUSION: Left-sided pneumothorax, estimated size 20%.  FINDINGS called Dr. Garza at the time of completion 7:30 PM.  This report was finalized on 1/26/2023 7:26 PM by Dr. Levon Cole M.D.        Medical Decision Making:  ED Course as of 01/26/23 2107   Thu Jan 26, 2023 1949 Patient is a well-appearing 32-year-old who presents today with left back pain and shortness of breath after having acupuncture done around 10 AM this morning.  Discussed with patient that her chest x-ray does show that she has a left-sided pneumothorax.  Call has been placed to thoracic surgery. [MS]   1949 I viewed the patient's chest imaging in PACS.  My interpretation is left-sided pneumothorax.  See dictation for official radiology interpretation.     [MS]   1950 Reviewed pt's history and workup with Dr. Velasquez.  After a bedside evaluation, he agrees with the plan of care.      [MS]   2004 Discussed with Dr Gardner, thoracic surgery, regarding patient's relevant history, exam, workup and ED findings/concerns.  He agrees to see patient in the am, would like for patient to get chest tube tonight.     [MS]   2051 Dr Velasquez placed a chest tube, patient tolerated well, repeat chest xray ordered for chest tube placement. [MS]   2104 Consult Note    Discussed care with Dr Conteh  Reviewed patient's history, exam, results and need for admission secondary to left pneumothorax  Dr. Conteh accepts the patient to be admitted to telemetry observation bed.     [MS]      ED Course User Index  [MS] Zuhair Tamera RADHA, APRN       Chest Tube Insertion    Date/Time: 1/26/2023 9:08 PM  Performed by: Manny Velasquez II, MD  Authorized by: Manny Velasquez II, MD     Consent:     Consent obtained:  Written    Consent given by:  Patient    Risks discussed:  Bleeding, incomplete drainage, infection, pain and damage to surrounding structures  Pre-procedure details:     Skin preparation:  Chlorhexidine    Preparation: Patient was prepped and draped in the usual sterile fashion    Sedation:     Sedation type:  Anxiolysis  Anesthesia:     Anesthesia method:  Local infiltration    Local anesthetic:  Lidocaine 1% w/o epi  Procedure details:     Placement location:  L lateral    Scalpel size:  11    Tube size (Frisian): 14.    Tension pneumothorax: no      Tube connected to:  Suction    Drainage characteristics:  Air only    Suture material:  2-0 silk    Dressing:  4x4 sterile gauze and petrolatum-impregnated gauze  Post-procedure details:     Post-insertion x-ray findings: tube in good position      Procedure completion:  Tolerated well, no immediate complications      Chest x-ray interpreted by myself.  Left pneumothorax has improved after placement of chest tube.      PPE: Both the patient and I wore a surgical mask throughout the entire patient encounter.     Diagnosis  Final diagnoses:   Pneumothorax, left         Manny Velasquez II, MD  01/26/23 2681

## 2023-01-27 NOTE — ED NOTES
Nursing report ED to floor  Lena Sapp  32 y.o.  female    HPI :   Chief Complaint   Patient presents with    Back Pain       Admitting doctor:   Napoleon Conteh MD    Admitting diagnosis:   The encounter diagnosis was Pneumothorax, left.    Code status:   Current Code Status       Date Active Code Status Order ID Comments User Context       Not on file            Allergies:   Patient has no known allergies.    Isolation:   No active isolations    Intake and Output  No intake or output data in the 24 hours ending 01/26/23 2153    Weight:       01/26/23 1942   Weight: 54.4 kg (120 lb)       Most recent vitals:   Vitals:    01/26/23 2107 01/26/23 2109 01/26/23 2112 01/26/23 2115   BP:  112/83     BP Location:       Patient Position:       Pulse: 86 96 78 85   Resp:       Temp:       TempSrc:       SpO2: 100% 100% 100% 100%   Weight:       Height:           Active LDAs/IV Access:   Lines, Drains & Airways       Active LDAs       Name Placement date Placement time Site Days    Peripheral IV 01/26/23 1958 Right Antecubital 01/26/23 1958  Antecubital  less than 1    Chest Tube Left 01/26/23 2148  --  less than 1                    Labs (abnormal labs have a star):   Labs Reviewed   CBC WITH AUTO DIFFERENTIAL - Abnormal; Notable for the following components:       Result Value    RDW 11.5 (*)     All other components within normal limits   COMPREHENSIVE METABOLIC PANEL    Narrative:     GFR Normal >60  Chronic Kidney Disease <60  Kidney Failure <15     CBC AND DIFFERENTIAL    Narrative:     The following orders were created for panel order CBC & Differential.  Procedure                               Abnormality         Status                     ---------                               -----------         ------                     CBC Auto Differential[[id number]]        Abnormal            Final result                 Please view results for these tests on the individual orders.       EKG:   No orders to display        Meds given in ED:   Medications   sodium chloride 0.9 % flush 10 mL (has no administration in time range)   midazolam (VERSED) injection 2 mg (2 mg Intravenous Given 1/26/23 2026)   HYDROmorphone (DILAUDID) injection 0.5 mg (0.5 mg Intravenous Given 1/26/23 2025)   ondansetron (ZOFRAN) injection 4 mg (4 mg Intravenous Given 1/26/23 2025)   sodium chloride 0.9 % bolus 1,000 mL (1,000 mL Intravenous New Bag 1/26/23 2053)   HYDROmorphone (DILAUDID) injection 0.5 mg (0.5 mg Intravenous Given 1/26/23 2121)       Imaging results:  XR Chest 1 View    Result Date: 1/26/2023  Interval placement of a left-sided pleural drainage catheter. Residual tiny left apical pneumothorax noted.  This report was finalized on 1/26/2023 9:23 PM by Dr. Jacinda Hamlin M.D.       Ambulatory status:   Up ad annmarie    Social issues:   Social History     Socioeconomic History    Marital status: Single   Tobacco Use    Smoking status: Never   Vaping Use    Vaping Use: Never used   Substance and Sexual Activity    Alcohol use: Never    Drug use: Never       NIH Stroke Scale:         Portia Minor RN  01/26/23 21:53 EST

## 2023-01-27 NOTE — PROGRESS NOTES
Name: Lena Sapp ADMIT: 2023   : 1990  PCP: Provider, No Known    MRN: [id number] LOS: 0 days   AGE/SEX: 32 y.o. female  ROOM: [facility]   Subjective   Chief Complaint   Patient presents with   • Back Pain     S/p chest tube placement yesterday  Removed today   Feels well  On RA    ROS  No f/c  No n/v  No cp/palp  No soa/cough    Objective   Vital Signs  Temp:  [98.1 °F (36.7 °C)-100 °F (37.8 °C)] 98.1 °F (36.7 °C)  Heart Rate:  [] 92  Resp:  [14-18] 18  BP: ()/(58-85) 105/72  SpO2:  [97 %-100 %] 100 %  on   ;   Device (Oxygen Therapy): room air  Body mass index is 19.97 kg/m².    Physical Exam  Constitutional:       General: She is not in acute distress.  HENT:      Head: Normocephalic and atraumatic.   Eyes:      General: No scleral icterus.  Cardiovascular:      Rate and Rhythm: Regular rhythm.      Heart sounds: Normal heart sounds.   Pulmonary:      Effort: Pulmonary effort is normal. No respiratory distress.   Abdominal:      General: There is no distension.      Palpations: Abdomen is soft.   Musculoskeletal:      Cervical back: Neck supple.   Neurological:      Mental Status: She is alert.   Psychiatric:         Behavior: Behavior normal.         Results Review:       I reviewed the patient's new clinical results.  Results from last 7 days   Lab Units 23   WBC 10*3/mm3 5.80   HEMOGLOBIN g/dL 13.2   PLATELETS 10*3/mm3 280     Results from last 7 days   Lab Units 23   SODIUM mmol/L 140   POTASSIUM mmol/L 4.0   CHLORIDE mmol/L 106   CO2 mmol/L 24.7   BUN mg/dL 11   CREATININE mg/dL 0.77   GLUCOSE mg/dL 82   Estimated Creatinine Clearance: 89.2 mL/min (by C-G formula based on SCr of 0.77 mg/dL).  Results from last 7 days   Lab Units 23   ALBUMIN g/dL 4.6   BILIRUBIN mg/dL 0.2   ALK PHOS U/L 57   AST (SGOT) U/L 15   ALT (SGPT) U/L 14     Results from last 7 days   Lab Units 23   CALCIUM mg/dL 9.1   ALBUMIN g/dL 4.6         Coag      HbA1C No results found for: HGBA1C  Infection     Radiology(recent) XR Chest 1 View    Result Date: 1/26/2023  Interval placement of a left-sided pleural drainage catheter. Residual tiny left apical pneumothorax noted.  This report was finalized on 1/26/2023 9:23 PM by Dr. Jacinda Hamlin M.D.      No results found for: TROPONINT, TROPONINI, BNP  No components found for: TSH;2    enoxaparin, 40 mg, Subcutaneous, Q24H  sodium chloride, 10 mL, Intravenous, Q12H  traZODone, 50 mg, Oral, Nightly       Diet: Regular/House Diet; Texture: Regular Texture (IDDSI 7); Fluid Consistency: Thin (IDDSI 0)      Assessment & Plan      Active Hospital Problems    Diagnosis  POA   • **Pneumothorax, left [J93.9]  Yes   • Insomnia [G47.00]  Yes   • Pleuritic chest pain [R07.81]  Yes      Resolved Hospital Problems   No resolved problems to display.       · S/p CT yesterday, removed today  · Residual PTX on xray today  · PRN agents for pain  · Home trazodone  · D/W Thoracic Surgery service. With persistent PTX on imaging this afternoon will keep in hospital and repeat CXR in AM per their recomendations      DW RN  DW Faiza Valdovinos DNP, APRN  DW family    Nuno Castellano MD  [facility]  01/27/23  18:34 EST

## 2023-01-27 NOTE — PLAN OF CARE
Problem: Pain Acute  Goal: Acceptable Pain Control and Functional Ability  Outcome: Ongoing, Progressing  Intervention: Develop Pain Management Plan  Recent Flowsheet Documentation  Taken 1/27/2023 0815 by Yelena Jay, RN  Pain Management Interventions: see MAR  Intervention: Optimize Psychosocial Wellbeing  Recent Flowsheet Documentation  Taken 1/27/2023 0815 by Yelena Jay, RN  Diversional Activities:   television   smartphone   Goal Outcome Evaluation:  Plan of Care Reviewed With: patient, mother        Progress: no change  Outcome Evaluation: VSS. Up ad annmarie. Discharge home tomorrow pending CXR. Will monitor.

## 2023-01-27 NOTE — PROGRESS NOTES
Progress note    The chest tube that was placed in ER last night resulted in resolution of the pneumothorax.  However, the chest tube was noted to be shifted on the morning x-ray.  There was small apical pneumothorax.  The tube was not in communication with the pleural cavity and the water column was not tidaling as well.  Therefore the chest tube was removed.    Repeat chest x-ray revealed slight increase in the pneumothorax.  I recommend she stay in the hospital overnight for observation.    If she has increasing pneumothorax, I will plan for chest placement.    Jj Villar MD  Thoracic Surgeon

## 2023-01-28 ENCOUNTER — APPOINTMENT (OUTPATIENT)
Dept: GENERAL RADIOLOGY | Facility: HOSPITAL | Age: 33
DRG: 201 | End: 2023-01-28
Payer: COMMERCIAL

## 2023-01-28 ENCOUNTER — APPOINTMENT (OUTPATIENT)
Dept: CT IMAGING | Facility: HOSPITAL | Age: 33
DRG: 201 | End: 2023-01-28
Payer: COMMERCIAL

## 2023-01-28 PROCEDURE — 0 LIDOCAINE 1 % SOLUTION: Performed by: SURGERY

## 2023-01-28 PROCEDURE — 25010000002 ENOXAPARIN PER 10 MG: Performed by: INTERNAL MEDICINE

## 2023-01-28 PROCEDURE — 25010000002 HYDROMORPHONE PER 4 MG: Performed by: NURSE PRACTITIONER

## 2023-01-28 PROCEDURE — 71045 X-RAY EXAM CHEST 1 VIEW: CPT

## 2023-01-28 PROCEDURE — 25010000002 MORPHINE PER 10 MG: Performed by: NURSE PRACTITIONER

## 2023-01-28 PROCEDURE — 71250 CT THORAX DX C-: CPT

## 2023-01-28 PROCEDURE — 99024 POSTOP FOLLOW-UP VISIT: CPT | Performed by: NURSE PRACTITIONER

## 2023-01-28 PROCEDURE — 0W9B30Z DRAINAGE OF LEFT PLEURAL CAVITY WITH DRAINAGE DEVICE, PERCUTANEOUS APPROACH: ICD-10-PCS | Performed by: SURGERY

## 2023-01-28 RX ORDER — DIAZEPAM 2 MG/1
2 TABLET ORAL EVERY 6 HOURS PRN
Status: DISCONTINUED | OUTPATIENT
Start: 2023-01-28 | End: 2023-01-31 | Stop reason: HOSPADM

## 2023-01-28 RX ORDER — LIDOCAINE HYDROCHLORIDE 10 MG/ML
30 INJECTION, SOLUTION INFILTRATION; PERINEURAL ONCE
Status: DISCONTINUED | OUTPATIENT
Start: 2023-01-28 | End: 2023-01-28

## 2023-01-28 RX ORDER — OXYCODONE HYDROCHLORIDE 5 MG/1
5 TABLET ORAL EVERY 4 HOURS PRN
Status: DISCONTINUED | OUTPATIENT
Start: 2023-01-28 | End: 2023-01-31 | Stop reason: HOSPADM

## 2023-01-28 RX ORDER — MORPHINE SULFATE 2 MG/ML
4 INJECTION, SOLUTION INTRAMUSCULAR; INTRAVENOUS EVERY 4 HOURS PRN
Status: DISCONTINUED | OUTPATIENT
Start: 2023-01-28 | End: 2023-01-31 | Stop reason: HOSPADM

## 2023-01-28 RX ORDER — LIDOCAINE HYDROCHLORIDE 10 MG/ML
30 INJECTION, SOLUTION INFILTRATION; PERINEURAL ONCE
Status: COMPLETED | OUTPATIENT
Start: 2023-01-28 | End: 2023-01-28

## 2023-01-28 RX ORDER — HYDROMORPHONE HYDROCHLORIDE 1 MG/ML
0.5 INJECTION, SOLUTION INTRAMUSCULAR; INTRAVENOUS; SUBCUTANEOUS
Status: DISCONTINUED | OUTPATIENT
Start: 2023-01-28 | End: 2023-01-28

## 2023-01-28 RX ORDER — GABAPENTIN 300 MG/1
300 CAPSULE ORAL 3 TIMES DAILY
Status: DISCONTINUED | OUTPATIENT
Start: 2023-01-28 | End: 2023-01-31 | Stop reason: HOSPADM

## 2023-01-28 RX ORDER — ACETAMINOPHEN 500 MG
1000 TABLET ORAL 3 TIMES DAILY
Status: DISCONTINUED | OUTPATIENT
Start: 2023-01-28 | End: 2023-01-31 | Stop reason: HOSPADM

## 2023-01-28 RX ADMIN — OXYCODONE HYDROCHLORIDE 5 MG: 5 TABLET ORAL at 10:32

## 2023-01-28 RX ADMIN — LIDOCAINE HYDROCHLORIDE 30 ML: 10 INJECTION, SOLUTION INFILTRATION; PERINEURAL at 10:20

## 2023-01-28 RX ADMIN — GABAPENTIN 300 MG: 300 CAPSULE ORAL at 20:19

## 2023-01-28 RX ADMIN — TRAZODONE HYDROCHLORIDE 50 MG: 50 TABLET ORAL at 20:19

## 2023-01-28 RX ADMIN — OXYCODONE HYDROCHLORIDE 5 MG: 5 TABLET ORAL at 18:59

## 2023-01-28 RX ADMIN — Medication 10 ML: at 09:26

## 2023-01-28 RX ADMIN — GABAPENTIN 300 MG: 300 CAPSULE ORAL at 10:32

## 2023-01-28 RX ADMIN — GABAPENTIN 300 MG: 300 CAPSULE ORAL at 17:07

## 2023-01-28 RX ADMIN — HYDROMORPHONE HYDROCHLORIDE 0.25 MG: 1 INJECTION, SOLUTION INTRAMUSCULAR; INTRAVENOUS; SUBCUTANEOUS at 09:57

## 2023-01-28 RX ADMIN — OXYCODONE HYDROCHLORIDE 5 MG: 5 TABLET ORAL at 22:58

## 2023-01-28 RX ADMIN — ACETAMINOPHEN 1000 MG: 500 TABLET, FILM COATED ORAL at 17:07

## 2023-01-28 RX ADMIN — Medication 10 ML: at 20:19

## 2023-01-28 RX ADMIN — OXYCODONE HYDROCHLORIDE 5 MG: 5 TABLET ORAL at 14:39

## 2023-01-28 RX ADMIN — ENOXAPARIN SODIUM 40 MG: 100 INJECTION SUBCUTANEOUS at 18:59

## 2023-01-28 RX ADMIN — ACETAMINOPHEN 1000 MG: 500 TABLET, FILM COATED ORAL at 20:19

## 2023-01-28 RX ADMIN — ACETAMINOPHEN 1000 MG: 500 TABLET, FILM COATED ORAL at 09:57

## 2023-01-28 RX ADMIN — MORPHINE SULFATE 4 MG: 2 INJECTION, SOLUTION INTRAMUSCULAR; INTRAVENOUS at 15:22

## 2023-01-28 NOTE — PROGRESS NOTES
Name: Lena Sapp ADMIT: 2023   : 1990  PCP: Provider, No Known    MRN: [id number] LOS: 0 days   AGE/SEX: 32 y.o. female  ROOM: 60     Subjective   Subjective        Patient is lying in bed does not appear to be any distress.  No new events overnight.  Denies nausea, vomiting abdominal pain.  Does complain of chest pain secondary to chest tube placement on the left side.       Objective   Objective   Vital Signs  Temp:  [98.1 °F (36.7 °C)-98.8 °F (37.1 °C)] 98.5 °F (36.9 °C)  Heart Rate:  [] 104  Resp:  [16-18] 18  BP: (104-126)/(68-82) 126/82  SpO2:  [97 %-100 %] 99 %  on   ;      Body mass index is 19.97 kg/m².  Physical Exam  Constitutional:       General: She is in acute distress.   HENT:      Head: Normocephalic and atraumatic.      Mouth/Throat:      Mouth: Mucous membranes are moist.   Eyes:      Extraocular Movements: Extraocular movements intact.      Conjunctiva/sclera: Conjunctivae normal.      Pupils: Pupils are equal, round, and reactive to light.   Cardiovascular:      Rate and Rhythm: Normal rate and regular rhythm.      Pulses: Normal pulses.      Heart sounds: Normal heart sounds.   Pulmonary:      Effort: No respiratory distress.      Breath sounds: Normal breath sounds.      Comments: Diminished breath sounds on the left, chest tube noted on the left side  Abdominal:      General: Bowel sounds are normal. There is no distension.      Palpations: Abdomen is soft.      Tenderness: There is no abdominal tenderness.   Musculoskeletal:      Cervical back: Normal range of motion and neck supple.      Right lower leg: No edema.      Left lower leg: No edema.   Skin:     General: Skin is warm and dry.   Neurological:      General: No focal deficit present.      Mental Status: She is alert and oriented to person, place, and time.   Psychiatric:         Mood and Affect: Mood normal.         Behavior: Behavior normal.       Results Review     I reviewed the patient's new  clinical results.  Results from last 7 days   Lab Units 01/26/23 1958   WBC 10*3/mm3 5.80   HEMOGLOBIN g/dL 13.2   PLATELETS 10*3/mm3 280     Results from last 7 days   Lab Units 01/26/23 1958   SODIUM mmol/L 140   POTASSIUM mmol/L 4.0   CHLORIDE mmol/L 106   CO2 mmol/L 24.7   BUN mg/dL 11   CREATININE mg/dL 0.77   GLUCOSE mg/dL 82   EGFR mL/min/1.73 104.6     Results from last 7 days   Lab Units 01/26/23 1958   ALBUMIN g/dL 4.6   BILIRUBIN mg/dL 0.2   ALK PHOS U/L 57   AST (SGOT) U/L 15   ALT (SGPT) U/L 14     Results from last 7 days   Lab Units 01/26/23 1958   CALCIUM mg/dL 9.1   ALBUMIN g/dL 4.6       No results found for: HGBA1C, POCGLU    XR Chest 1 View    Result Date: 1/28/2023  Chest tube placement with marked decrease of previous left pneumothorax.  This report was finalized on 1/28/2023 10:44 AM by Dr. Elia Becker M.D.      XR Chest 1 View    Result Date: 1/28/2023  Further interval increase in left pneumothorax.  Discussed by telephone with patient's nurse, John, at time of interpretation, 0617, 01/28/2023.  This report was finalized on 1/28/2023 6:23 AM by Dr. Elia Becker M.D.      XR Chest 1 View    Result Date: 1/26/2023  Interval placement of a left-sided pleural drainage catheter. Residual tiny left apical pneumothorax noted.  This report was finalized on 1/26/2023 9:23 PM by Dr. Jacinda Hamlin M.D.      I have personally reviewed all medications:  Scheduled Medications  acetaminophen, 1,000 mg, Oral, TID  enoxaparin, 40 mg, Subcutaneous, Q24H  gabapentin, 300 mg, Oral, TID  sodium chloride, 10 mL, Intravenous, Q12H  traZODone, 50 mg, Oral, Nightly    Infusions   Diet  Diet: Regular/House Diet; Texture: Regular Texture (IDDSI 7); Fluid Consistency: Thin (IDDSI 0)    I have personally reviewed:  [x]  Laboratory   [x]  Microbiology   [x]  Radiology   [x]  EKG/Telemetry  [x]  Cardiology/Vascular   [x]  Pathology    [x]  Records       Assessment/Plan     Active Hospital Problems     Diagnosis  POA   • **Pneumothorax, left [J93.9]  Yes   • Insomnia [G47.00]  Yes   • Pleuritic chest pain [R07.81]  Yes      Resolved Hospital Problems   No resolved problems to display.       32 y.o. female admitted with Pneumothorax, left.      1. Left-sided pneumothorax status post acute puncture , chest tube was initially discontinued/ removed yesterday and repeat chest x-ray today revealed worsening pneumothorax therefore underwent placement of chest tube again by CT surgery.  Continue with analgesics.  2. On Lovenox for DVT prophylaxis.    3. Code status is full code.      Julio William MD  [facility]  01/28/23  13:11 EST

## 2023-01-28 NOTE — PROGRESS NOTES
"    Chief Complaint: Left pneumothorax  S/P: Chest tube placement  POD # 0    Subjective:  Symptoms:  Worsening.  She reports shortness of breath and chest pain.    Diet:  Adequate intake.    Activity level: Impaired due to pain.    Pain:  She complains of pain that is moderate.  Pain is partially controlled.        Vital Signs:  Temp:  [98.1 °F (36.7 °C)-98.8 °F (37.1 °C)] 98.5 °F (36.9 °C)  Heart Rate:  [89-95] 94  Resp:  [16-18] 18  BP: (104-116)/(68-72) 104/70    Intake & Output (last day)       01/27 0701  01/28 0700 01/28 0701  01/29 0700    P.O. 740     Total Intake(mL/kg) 740 (13.6)     Net +740           Urine Unmeasured Occurrence 5 x           Objective:  General Appearance:  Uncomfortable, well-appearing and in no acute distress.    Vital signs: (most recent): Blood pressure 104/70, pulse 94, temperature 98.5 °F (36.9 °C), temperature source Oral, resp. rate 18, height 165.1 cm (65\"), weight 54.4 kg (120 lb), last menstrual period 12/28/2022, SpO2 97 %.  Vital signs are normal.    Output: Producing urine.    HEENT: Normal HEENT exam.    Lungs:  Normal effort.  She is not in respiratory distress.  There are decreased breath sounds.    Heart: Normal rate.    Abdomen: Abdomen is soft.    Extremities: Normal range of motion.    Pulses: Distal pulses are intact.    Neurological: Patient is alert and oriented to person, place and time.    Pupils:  Pupils are equal, round, and reactive to light.    Skin:  Warm.              Chest tube:   Site: Left, Clean, Dry, Intact and Securement device intact  Suction: -20 cm  Air Leak: negative    Results Review:     I reviewed the patient's new clinical results.  I reviewed the patient's new imaging results and agree with the interpretation.  I reviewed the patient's other test results and agree with the interpretation  Discussed with patient, mother at bedside and Dr. Villar    Imaging Results (Last 24 Hours)     Procedure Component Value Units Date/Time    XR Chest 1 View " [[id number]] Collected: 01/28/23 1043     Updated: 01/28/23 1047    Narrative:      XR CHEST 1 VW-     HISTORY: Female who is 32 years-old,  chest tube placement     TECHNIQUE: Frontal view of the chest     COMPARISON: 01/28/2023     FINDINGS: Left chest tube is seen at the left base. Heart, mediastinum  and pulmonary vasculature are unremarkable. The left lung has  reexpanded, and left pneumothorax is markedly decreased, now measuring  about 2 mm at the apex. No focal pulmonary consolidation, pleural  effusion, or right pneumothorax. No acute osseous process.       Impression:      Chest tube placement with marked decrease of previous left  pneumothorax.     This report was finalized on 1/28/2023 10:44 AM by Dr. Elia Becker M.D.       XR Chest 1 View [[id number]] Collected: 01/28/23 0618     Updated: 01/28/23 0626    Narrative:      XR CHEST 1 VW-     HISTORY: Female who is 32 years-old,  left pneumothorax     TECHNIQUE: Frontal view of the chest     COMPARISON: 01/27/2023     FINDINGS: Heart, mediastinum and pulmonary vasculature are unremarkable.  Previous left pneumothorax shows interval increase, peripherally  measuring as much as 2.1 cm near the base, and 2.4 cm at the upper  hemithorax. At the apex, the pneumothorax measures about 3.5 cm.  Estimated 25-30% left pneumothorax. Mild atelectasis is seen in the left  lung. No pleural effusion, or right pneumothorax. No acute osseous  process.       Impression:      Further interval increase in left pneumothorax.     Discussed by telephone with patient's nurse, John, at time of  interpretation, 0617, 01/28/2023.     This report was finalized on 1/28/2023 6:23 AM by Dr. Elia Becker M.D.       XR Chest 1 View [[id number]] Collected: 01/27/23 1319     Updated: 01/27/23 1323    Narrative:      XR CHEST 1 VW-     Chemical: Chest tube removal     COMPARISON 1/27/2023 at 0933 hours, current examination 1218 hours     FINDINGS: The left-sided pneumothorax  demonstrates interval enlargement.  The left-sided chest tube has been removed. Distance between the pleural  reflection and apex is currently 3.8 cm compared to 2 cm previously.     No mediastinal shift, the right lung remains clear. No pleural fusion.     CONCLUSION: Enlarging left pneumothorax status post chest tube removal.     This report was finalized on 1/27/2023 1:20 PM by Dr. Levon Cole M.D.             Lab Results:     Lab Results (last 24 hours)     ** No results found for the last 24 hours. **           Assessment & Plan       Pneumothorax, left    Insomnia    Pleuritic chest pain       Assessment & Plan    I have independently reviewed the chest x-ray performed this morning prior to chest tube placement which demonstrates interval increase in pleural sided pneumothorax.    Left pneumothorax: Patient had acupuncture on 1/26/2023 with increased shortness of breath and chest pain.  Chest x-ray in the ER showed pneumothorax and chest tube was placed.  This appeared resolved on imaging yesterday and chest tube was removed at the bedside.  Follow-up imaging demonstrated increased pleural separation and a subsequent chest tube was placed at the bedside today by Dr. Villar.  Follow-up imaging is again stable with adequate aeration and expansion of the left lung.  We will leave her chest tube to -20 cm of suction and recheck a chest x-ray in the morning.  A CT of the chest without contrast has also been ordered for further evaluation.      Faiza Valdovinos DNP, APRN  [facility]  01/28/23  11:45 EST

## 2023-01-28 NOTE — PROCEDURES
Chest Tube Insertion Procedure Note    Indications:  Clinically significant Pneumothorax    Pre-operative Diagnosis: Iatrogenic left pneumothorax  Post-operative Diagnosis: Iatrogenic left pneumothorax    Procedure Details   Informed consent was obtained for the procedure, including sedation.  Risks of lung perforation, hemorrhage, arrhythmia, and adverse drug reaction were discussed.     After sterile skin prep, using standard technique, a 14 Polish tube was placed in the left lateral 6th rib space.    Findings:  Gush of air upon entry to the chest cavity    Estimated Blood Loss:  Minimal           Specimens: * Cannot find log *                Complications:  None; patient tolerated the procedure well.           Disposition: PACU - hemodynamically stable.           Condition: stable    Attending Attestation: I performed the procedure.

## 2023-01-28 NOTE — PLAN OF CARE
Goal Outcome Evaluation:  Plan of Care Reviewed With: patient     VSS. Pain to L chest/back treated with prn oxy. Up ad annmarie. D/c home today pending cxr results.     Progress: improving

## 2023-01-28 NOTE — PLAN OF CARE
Problem: Adult Inpatient Plan of Care  Goal: Plan of Care Review  Outcome: Ongoing, Progressing  Flowsheets  Taken 1/28/2023 1852 by Donna Hubbard RN  Plan of Care Reviewed With:   patient   mother  Outcome Evaluation: patient had chest tube placed at bedside. patient tolerated procedure well and pain medication given throughout shift staggering dosing. patient reports improved pain level by end of shift. patient up in room and up to chair.  Taken 1/28/2023 0403 by Nohelia Niño RN  Progress: improving  Goal: Patient-Specific Goal (Individualized)  Outcome: Ongoing, Progressing  Goal: Absence of Hospital-Acquired Illness or Injury  Outcome: Ongoing, Progressing  Intervention: Identify and Manage Fall Risk  Recent Flowsheet Documentation  Taken 1/28/2023 1647 by Donna Hubbard RN  Safety Promotion/Fall Prevention: safety round/check completed  Taken 1/28/2023 1439 by Donna Hubbard RN  Safety Promotion/Fall Prevention:   activity supervised   assistive device/personal items within reach   clutter free environment maintained   fall prevention program maintained   nonskid shoes/slippers when out of bed   room organization consistent   safety round/check completed  Taken 1/28/2023 1032 by Donna Hubbard RN  Safety Promotion/Fall Prevention: safety round/check completed  Taken 1/28/2023 0800 by Donna Hubbard RN  Safety Promotion/Fall Prevention:   activity supervised   assistive device/personal items within reach   clutter free environment maintained   fall prevention program maintained   nonskid shoes/slippers when out of bed   room organization consistent   safety round/check completed  Intervention: Prevent Skin Injury  Recent Flowsheet Documentation  Taken 1/28/2023 1647 by Donna Hubbard RN  Body Position:   position changed independently   patient/family refused  Taken 1/28/2023 1439 by Donna Hubbard RN  Body Position:   position changed independently   patient/family refused  Skin  Protection: adhesive use limited  Taken 1/28/2023 1032 by Donna Hubbard RN  Body Position:   position changed independently   patient/family refused  Taken 1/28/2023 0800 by Donna Hubbard RN  Body Position:   position changed independently   patient/family refused  Skin Protection: adhesive use limited  Intervention: Prevent and Manage VTE (Venous Thromboembolism) Risk  Recent Flowsheet Documentation  Taken 1/28/2023 1647 by Donna Hubbard RN  Activity Management: activity adjusted per tolerance  Taken 1/28/2023 1439 by Donna Hubbard RN  Activity Management:   activity adjusted per tolerance   activity encouraged  VTE Prevention/Management: (Lovenox) --  Range of Motion: active ROM (range of motion) encouraged  Taken 1/28/2023 1032 by Donna Hubbard RN  Activity Management: activity adjusted per tolerance  Taken 1/28/2023 0800 by Donna Hubbard RN  Activity Management:   activity adjusted per tolerance   activity encouraged  Range of Motion: active ROM (range of motion) encouraged  Intervention: Prevent Infection  Recent Flowsheet Documentation  Taken 1/28/2023 0800 by Donna Hubbard RN  Infection Prevention: hand hygiene promoted  Goal: Optimal Comfort and Wellbeing  Outcome: Ongoing, Progressing  Intervention: Monitor Pain and Promote Comfort  Recent Flowsheet Documentation  Taken 1/28/2023 1647 by Donna Hubbard RN  Pain Management Interventions:   position adjusted   pillow support provided   care clustered  Taken 1/28/2023 1439 by Donna Hubbard RN  Pain Management Interventions: see MAR  Taken 1/28/2023 1032 by Donna Hubbard RN  Pain Management Interventions: see MAR  Taken 1/28/2023 0800 by Donna Hubbard RN  Pain Management Interventions: care clustered  Intervention: Provide Person-Centered Care  Recent Flowsheet Documentation  Taken 1/28/2023 1439 by Donna Hubbard RN  Trust Relationship/Rapport:   care explained   choices provided   questions answered   reassurance  provided   thoughts/feelings acknowledged  Taken 1/28/2023 0800 by Donna Hubbard RN  Trust Relationship/Rapport:   care explained   choices provided   questions answered   thoughts/feelings acknowledged   reassurance provided  Goal: Readiness for Transition of Care  Outcome: Ongoing, Progressing     Problem: Pain Acute  Goal: Acceptable Pain Control and Functional Ability  Outcome: Ongoing, Progressing  Intervention: Prevent or Manage Pain  Recent Flowsheet Documentation  Taken 1/28/2023 1647 by Donna Hubbard RN  Medication Review/Management: medications reviewed  Taken 1/28/2023 1439 by Donna Hubbard RN  Medication Review/Management: medications reviewed  Taken 1/28/2023 0800 by Donna Hubbard RN  Medication Review/Management: medications reviewed  Intervention: Develop Pain Management Plan  Recent Flowsheet Documentation  Taken 1/28/2023 1647 by Donna Hubbard RN  Pain Management Interventions:   position adjusted   pillow support provided   care clustered  Taken 1/28/2023 1439 by Donna Hubbard RN  Pain Management Interventions: see MAR  Taken 1/28/2023 1032 by Donna Hubbard RN  Pain Management Interventions: see MAR  Taken 1/28/2023 0800 by Donna Hubbard RN  Pain Management Interventions: care clustered  Intervention: Optimize Psychosocial Wellbeing  Recent Flowsheet Documentation  Taken 1/28/2023 1439 by Donna Hubbard RN  Diversional Activities:   smartphone   television  Taken 1/28/2023 0800 by Donna Hubbard RN  Diversional Activities:   smartphone   television   Goal Outcome Evaluation:  Plan of Care Reviewed With: patient, mother           Outcome Evaluation: patient had chest tube placed at bedside. patient tolerated procedure well and pain medication given throughout shift staggering dosing. patient reports improved pain level by end of shift. patient up in room and up to chair.

## 2023-01-29 ENCOUNTER — APPOINTMENT (OUTPATIENT)
Dept: GENERAL RADIOLOGY | Facility: HOSPITAL | Age: 33
DRG: 201 | End: 2023-01-29
Payer: COMMERCIAL

## 2023-01-29 LAB
ANION GAP SERPL CALCULATED.3IONS-SCNC: 7 MMOL/L (ref 5–15)
BASOPHILS # BLD AUTO: 0.02 10*3/MM3 (ref 0–0.2)
BASOPHILS NFR BLD AUTO: 0.4 % (ref 0–1.5)
BUN SERPL-MCNC: 7 MG/DL (ref 6–20)
BUN/CREAT SERPL: 9.7 (ref 7–25)
CALCIUM SPEC-SCNC: 8.8 MG/DL (ref 8.6–10.5)
CHLORIDE SERPL-SCNC: 107 MMOL/L (ref 98–107)
CO2 SERPL-SCNC: 26 MMOL/L (ref 22–29)
CREAT SERPL-MCNC: 0.72 MG/DL (ref 0.57–1)
DEPRECATED RDW RBC AUTO: 39.9 FL (ref 37–54)
EGFRCR SERPLBLD CKD-EPI 2021: 113.4 ML/MIN/1.73
EOSINOPHIL # BLD AUTO: 0.14 10*3/MM3 (ref 0–0.4)
EOSINOPHIL NFR BLD AUTO: 2.9 % (ref 0.3–6.2)
ERYTHROCYTE [DISTWIDTH] IN BLOOD BY AUTOMATED COUNT: 11.5 % (ref 12.3–15.4)
GLUCOSE SERPL-MCNC: 97 MG/DL (ref 65–99)
HCT VFR BLD AUTO: 38.7 % (ref 34–46.6)
HGB BLD-MCNC: 13.1 G/DL (ref 12–15.9)
IMM GRANULOCYTES # BLD AUTO: 0.01 10*3/MM3 (ref 0–0.05)
IMM GRANULOCYTES NFR BLD AUTO: 0.2 % (ref 0–0.5)
LYMPHOCYTES # BLD AUTO: 2.25 10*3/MM3 (ref 0.7–3.1)
LYMPHOCYTES NFR BLD AUTO: 46.4 % (ref 19.6–45.3)
MCH RBC QN AUTO: 32.3 PG (ref 26.6–33)
MCHC RBC AUTO-ENTMCNC: 33.9 G/DL (ref 31.5–35.7)
MCV RBC AUTO: 95.3 FL (ref 79–97)
MONOCYTES # BLD AUTO: 0.59 10*3/MM3 (ref 0.1–0.9)
MONOCYTES NFR BLD AUTO: 12.2 % (ref 5–12)
NEUTROPHILS NFR BLD AUTO: 1.84 10*3/MM3 (ref 1.7–7)
NEUTROPHILS NFR BLD AUTO: 37.9 % (ref 42.7–76)
NRBC BLD AUTO-RTO: 0 /100 WBC (ref 0–0.2)
PLATELET # BLD AUTO: 283 10*3/MM3 (ref 140–450)
PMV BLD AUTO: 10.2 FL (ref 6–12)
POTASSIUM SERPL-SCNC: 4.1 MMOL/L (ref 3.5–5.2)
RBC # BLD AUTO: 4.06 10*6/MM3 (ref 3.77–5.28)
SODIUM SERPL-SCNC: 140 MMOL/L (ref 136–145)
WBC NRBC COR # BLD: 4.85 10*3/MM3 (ref 3.4–10.8)

## 2023-01-29 PROCEDURE — 25010000002 MORPHINE PER 10 MG: Performed by: NURSE PRACTITIONER

## 2023-01-29 PROCEDURE — 99232 SBSQ HOSP IP/OBS MODERATE 35: CPT | Performed by: SURGERY

## 2023-01-29 PROCEDURE — 85025 COMPLETE CBC W/AUTO DIFF WBC: CPT | Performed by: INTERNAL MEDICINE

## 2023-01-29 PROCEDURE — 25010000002 ENOXAPARIN PER 10 MG: Performed by: INTERNAL MEDICINE

## 2023-01-29 PROCEDURE — 71045 X-RAY EXAM CHEST 1 VIEW: CPT

## 2023-01-29 PROCEDURE — 80048 BASIC METABOLIC PNL TOTAL CA: CPT | Performed by: INTERNAL MEDICINE

## 2023-01-29 RX ORDER — BISACODYL 10 MG
10 SUPPOSITORY, RECTAL RECTAL DAILY PRN
Status: DISCONTINUED | OUTPATIENT
Start: 2023-01-29 | End: 2023-01-31 | Stop reason: HOSPADM

## 2023-01-29 RX ORDER — POLYETHYLENE GLYCOL 3350 17 G/17G
17 POWDER, FOR SOLUTION ORAL EVERY 12 HOURS PRN
Status: DISCONTINUED | OUTPATIENT
Start: 2023-01-29 | End: 2023-01-31 | Stop reason: HOSPADM

## 2023-01-29 RX ADMIN — Medication 10 ML: at 20:27

## 2023-01-29 RX ADMIN — GABAPENTIN 300 MG: 300 CAPSULE ORAL at 08:54

## 2023-01-29 RX ADMIN — OXYCODONE HYDROCHLORIDE 5 MG: 5 TABLET ORAL at 08:54

## 2023-01-29 RX ADMIN — ACETAMINOPHEN 1000 MG: 500 TABLET, FILM COATED ORAL at 16:49

## 2023-01-29 RX ADMIN — OXYCODONE HYDROCHLORIDE 5 MG: 5 TABLET ORAL at 17:58

## 2023-01-29 RX ADMIN — OXYCODONE HYDROCHLORIDE 5 MG: 5 TABLET ORAL at 21:58

## 2023-01-29 RX ADMIN — MORPHINE SULFATE 4 MG: 2 INJECTION, SOLUTION INTRAMUSCULAR; INTRAVENOUS at 05:58

## 2023-01-29 RX ADMIN — Medication 10 ML: at 08:55

## 2023-01-29 RX ADMIN — ENOXAPARIN SODIUM 40 MG: 100 INJECTION SUBCUTANEOUS at 20:27

## 2023-01-29 RX ADMIN — OXYCODONE HYDROCHLORIDE 5 MG: 5 TABLET ORAL at 13:35

## 2023-01-29 RX ADMIN — OXYCODONE HYDROCHLORIDE 5 MG: 5 TABLET ORAL at 03:52

## 2023-01-29 RX ADMIN — ACETAMINOPHEN 1000 MG: 500 TABLET, FILM COATED ORAL at 20:26

## 2023-01-29 RX ADMIN — GABAPENTIN 300 MG: 300 CAPSULE ORAL at 20:26

## 2023-01-29 RX ADMIN — TRAZODONE HYDROCHLORIDE 50 MG: 50 TABLET ORAL at 20:26

## 2023-01-29 RX ADMIN — POLYETHYLENE GLYCOL 3350 17 G: 17 POWDER, FOR SOLUTION ORAL at 20:36

## 2023-01-29 RX ADMIN — ACETAMINOPHEN 1000 MG: 500 TABLET, FILM COATED ORAL at 08:54

## 2023-01-29 RX ADMIN — GABAPENTIN 300 MG: 300 CAPSULE ORAL at 16:49

## 2023-01-29 NOTE — PLAN OF CARE
Problem: Adult Inpatient Plan of Care  Goal: Plan of Care Review  Outcome: Ongoing, Progressing  Flowsheets  Taken 1/29/2023 1806 by Donna Hubbard RN  Outcome Evaluation: patient ambulated in room and jefferson, patient chest tube to waterseal and tolerated well. plan is to clamp chest tube on 1/30. patient with c/o pain and treated with prn pain medication. patient states pain is much better controlled.  Taken 1/28/2023 1852 by Donna Hubbard RN  Plan of Care Reviewed With:   patient   mother  Taken 1/28/2023 0403 by Nohelia Niño RN  Progress: improving  Goal: Patient-Specific Goal (Individualized)  Outcome: Ongoing, Progressing  Goal: Absence of Hospital-Acquired Illness or Injury  Outcome: Ongoing, Progressing  Intervention: Identify and Manage Fall Risk  Recent Flowsheet Documentation  Taken 1/29/2023 1800 by Donna Hubbard RN  Safety Promotion/Fall Prevention: safety round/check completed  Taken 1/29/2023 1400 by Donna Hubbard RN  Safety Promotion/Fall Prevention:   activity supervised   assistive device/personal items within reach   clutter free environment maintained   fall prevention program maintained   nonskid shoes/slippers when out of bed   room organization consistent   safety round/check completed  Taken 1/29/2023 1215 by Donna Hubbard RN  Safety Promotion/Fall Prevention: safety round/check completed  Taken 1/29/2023 1036 by Donna Hubbard RN  Safety Promotion/Fall Prevention: safety round/check completed  Taken 1/29/2023 0845 by Donna Hubbard RN  Safety Promotion/Fall Prevention:   activity supervised   assistive device/personal items within reach   clutter free environment maintained   fall prevention program maintained   nonskid shoes/slippers when out of bed   room organization consistent   safety round/check completed  Intervention: Prevent Skin Injury  Recent Flowsheet Documentation  Taken 1/29/2023 1800 by Donna Hubbard RN  Body Position:   position changed  independently   patient/family refused  Taken 1/29/2023 1400 by Donna Hubbard RN  Skin Protection: adhesive use limited  Taken 1/29/2023 1215 by Donna Hubbard RN  Body Position:   position changed independently   patient/family refused  Taken 1/29/2023 1036 by Donna Hubbard RN  Body Position:   position changed independently   patient/family refused  Taken 1/29/2023 0845 by Donna Hubbard RN  Body Position:   position changed independently   patient/family refused  Skin Protection: adhesive use limited  Intervention: Prevent and Manage VTE (Venous Thromboembolism) Risk  Recent Flowsheet Documentation  Taken 1/29/2023 1800 by Donna Hubbard RN  Activity Management: activity adjusted per tolerance  Taken 1/29/2023 1400 by Donna Hubbard RN  VTE Prevention/Management: (Lovenox) other (see comments)  Range of Motion: active ROM (range of motion) encouraged  Taken 1/29/2023 1215 by Donna Hubbard RN  Activity Management: activity adjusted per tolerance  Taken 1/29/2023 1036 by Donna Hubbard RN  Activity Management: activity adjusted per tolerance  Taken 1/29/2023 0845 by Donna Hubbard RN  Activity Management: activity adjusted per tolerance  VTE Prevention/Management: (Lovenox) other (see comments)  Range of Motion: active ROM (range of motion) encouraged  Goal: Optimal Comfort and Wellbeing  Outcome: Ongoing, Progressing  Intervention: Monitor Pain and Promote Comfort  Recent Flowsheet Documentation  Taken 1/29/2023 1800 by Donna Hubbard RN  Pain Management Interventions: see MAR  Taken 1/29/2023 1036 by Donna Hubbard RN  Pain Management Interventions: no interventions per patient request  Taken 1/29/2023 0845 by Donna Hubbard RN  Pain Management Interventions: see MAR  Intervention: Provide Person-Centered Care  Recent Flowsheet Documentation  Taken 1/29/2023 1400 by Donna Hubbard RN  Trust Relationship/Rapport:   care explained   choices provided   questions answered    reassurance provided   thoughts/feelings acknowledged  Taken 1/29/2023 0845 by Donna Hubbard RN  Trust Relationship/Rapport:   care explained   choices provided   questions answered   thoughts/feelings acknowledged  Goal: Readiness for Transition of Care  Outcome: Ongoing, Progressing     Problem: Pain Acute  Goal: Acceptable Pain Control and Functional Ability  Outcome: Ongoing, Progressing  Intervention: Prevent or Manage Pain  Recent Flowsheet Documentation  Taken 1/29/2023 1800 by Donna Hubbard RN  Medication Review/Management: medications reviewed  Intervention: Develop Pain Management Plan  Recent Flowsheet Documentation  Taken 1/29/2023 1800 by Donna Hubbard RN  Pain Management Interventions: see MAR  Taken 1/29/2023 1036 by Donna Hubbard RN  Pain Management Interventions: no interventions per patient request  Taken 1/29/2023 0845 by Donna Hubbard RN  Pain Management Interventions: see MAR  Intervention: Optimize Psychosocial Wellbeing  Recent Flowsheet Documentation  Taken 1/29/2023 1400 by Donna Hubbard RN  Diversional Activities:   games   [employer]   smartphone   television  Taken 1/29/2023 0845 by Donna Hubbard RN  Diversional Activities: (art)   television   games   smartphone   other (see comments)   Goal Outcome Evaluation:  Plan of Care Reviewed With: patient, mother           Outcome Evaluation: patient ambulated in room and jefferson, patient chest tube to waterseal and tolerated well. plan is to clamp chest tube on 1/30. patient with c/o pain and treated with prn pain medication. patient states pain is much better controlled.

## 2023-01-29 NOTE — PROGRESS NOTES
Name: Lena Sapp ADMIT: 2023   : 1990  PCP: Provider, No Known    MRN: [id number] LOS: 1 days   AGE/SEX: 32 y.o. female  ROOM: [facility]/     Subjective   Subjective      Patient is sitting up on the bed and has no specific complaints.  Her chest tube insertion site pain is reasonably well controlled.  Denies nausea, vomiting, palpitations, dizziness.       Objective   Objective   Vital Signs  Temp:  [98 °F (36.7 °C)-98.9 °F (37.2 °C)] 98.3 °F (36.8 °C)  Heart Rate:  [] 96  Resp:  [16-18] 16  BP: (107-135)/(60-81) 135/79  SpO2:  [95 %-99 %] 97 %  on   ;   Device (Oxygen Therapy): room air  Body mass index is 19.97 kg/m².  Physical Exam  Constitutional:       General: She is in acute distress.   HENT:      Head: Normocephalic and atraumatic.      Mouth/Throat:      Mouth: Mucous membranes are moist.   Eyes:      Extraocular Movements: Extraocular movements intact.      Conjunctiva/sclera: Conjunctivae normal.      Pupils: Pupils are equal, round, and reactive to light.   Cardiovascular:      Rate and Rhythm: Normal rate and regular rhythm.      Pulses: Normal pulses.      Heart sounds: Normal heart sounds.   Pulmonary:      Effort: No respiratory distress.      Breath sounds: Normal breath sounds.      Comments: Diminished breath sounds on the left, chest tube noted on the left side  Abdominal:      General: Bowel sounds are normal. There is no distension.      Palpations: Abdomen is soft.      Tenderness: There is no abdominal tenderness.   Musculoskeletal:      Cervical back: Normal range of motion and neck supple.      Right lower leg: No edema.      Left lower leg: No edema.   Skin:     General: Skin is warm and dry.   Neurological:      General: No focal deficit present.      Mental Status: She is alert and oriented to person, place, and time.   Psychiatric:         Mood and Affect: Mood normal.         Behavior: Behavior normal.       Results Review     I reviewed the patient's new  clinical results.  Results from last 7 days   Lab Units 01/29/23  0400 01/26/23 1958   WBC 10*3/mm3 4.85 5.80   HEMOGLOBIN g/dL 13.1 13.2   PLATELETS 10*3/mm3 283 280     Results from last 7 days   Lab Units 01/29/23  0400 01/26/23 1958   SODIUM mmol/L 140 140   POTASSIUM mmol/L 4.1 4.0   CHLORIDE mmol/L 107 106   CO2 mmol/L 26.0 24.7   BUN mg/dL 7 11   CREATININE mg/dL 0.72 0.77   GLUCOSE mg/dL 97 82   EGFR mL/min/1.73 113.4 104.6     Results from last 7 days   Lab Units 01/26/23 1958   ALBUMIN g/dL 4.6   BILIRUBIN mg/dL 0.2   ALK PHOS U/L 57   AST (SGOT) U/L 15   ALT (SGPT) U/L 14     Results from last 7 days   Lab Units 01/29/23 0400 01/26/23 1958   CALCIUM mg/dL 8.8 9.1   ALBUMIN g/dL  --  4.6       No results found for: HGBA1C, POCGLU    CT Chest Without Contrast Diagnostic    Result Date: 1/28/2023  Small left pneumothorax with left chest tube in place. No pulmonary parenchymal abnormality or evidence for bleb or lung mass.  Radiation dose reduction techniques were utilized, including automated exposure control and exposure modulation based on body size.  This report was finalized on 1/28/2023 10:34 PM by Dr. Perico Bai M.D.      XR Chest 1 View    Result Date: 1/29/2023  Previous left pneumothorax appears mostly resolved, with trace residual left apical pneumothorax.  This report was finalized on 1/29/2023 5:58 AM by Dr. Elia Becker M.D.      XR Chest 1 View    Result Date: 1/28/2023  Chest tube placement with marked decrease of previous left pneumothorax.  This report was finalized on 1/28/2023 10:44 AM by Dr. Elia Becker M.D.      XR Chest 1 View    Result Date: 1/28/2023  Further interval increase in left pneumothorax.  Discussed by telephone with patient's nurse, John, at time of interpretation, 0617, 01/28/2023.  This report was finalized on 1/28/2023 6:23 AM by Dr. Elia Becker M.D.      I have personally reviewed all medications:  Scheduled Medications  acetaminophen,  1,000 mg, Oral, TID  enoxaparin, 40 mg, Subcutaneous, Q24H  gabapentin, 300 mg, Oral, TID  sodium chloride, 10 mL, Intravenous, Q12H  traZODone, 50 mg, Oral, Nightly    Infusions   Diet  Diet: Regular/House Diet; Texture: Regular Texture (IDDSI 7); Fluid Consistency: Thin (IDDSI 0)    I have personally reviewed:  [x]  Laboratory   [x]  Microbiology   [x]  Radiology   [x]  EKG/Telemetry  [x]  Cardiology/Vascular   [x]  Pathology    [x]  Records       Assessment/Plan     Active Hospital Problems    Diagnosis  POA   • **Pneumothorax, left [J93.9]  Yes   • Insomnia [G47.00]  Yes   • Pleuritic chest pain [R07.81]  Yes      Resolved Hospital Problems   No resolved problems to display.       32 y.o. female admitted with Pneumothorax, left.      1. Left-sided pneumothorax status post acute puncture , chest tube was initially discontinued on 1/27/2023  and repeat chest x-ray on 1/28/2023 revealed worsening pneumothorax therefore underwent placement of chest tube again by CT surgery.  Continue with analgesics.     2. On Lovenox for DVT prophylaxis.      3. Code status is full code.    Copied text on this note has been reviewed by me on 1/29/2023    Julio William MD  [facility]  01/29/23  15:31 EST

## 2023-01-29 NOTE — PROGRESS NOTES
"    Chief Complaint: Left pneumothorax  S/P: Chest tube placement  POD # 1    Subjective:  Symptoms:  Worsening.  She reports shortness of breath and chest pain.    Diet:  Adequate intake.    Activity level: Impaired due to pain.    Pain:  She complains of pain that is moderate.  Pain is partially controlled.        Vital Signs:  Temp:  [98 °F (36.7 °C)-99 °F (37.2 °C)] 98.4 °F (36.9 °C)  Heart Rate:  [] 96  Resp:  [16-18] 16  BP: (107-131)/(60-82) 107/60    Intake & Output (last day)       01/28 0701  01/29 0700 01/29 0701  01/30 0700    P.O. 960     Total Intake(mL/kg) 960 (17.6)     Urine (mL/kg/hr) 0 (0)     Chest Tube 19     Total Output 19     Net +941           Urine Unmeasured Occurrence 2 x           Objective:  General Appearance:  Uncomfortable, well-appearing and in no acute distress.    Vital signs: (most recent): Blood pressure 107/60, pulse 96, temperature 98.4 °F (36.9 °C), temperature source Oral, resp. rate 16, height 165.1 cm (65\"), weight 54.4 kg (120 lb), last menstrual period 12/28/2022, SpO2 95 %.  Vital signs are normal.    Output: Producing urine.    HEENT: Normal HEENT exam.    Lungs:  Normal effort.  She is not in respiratory distress.  There are decreased breath sounds.    Heart: Normal rate.    Abdomen: Abdomen is soft.    Extremities: Normal range of motion.    Pulses: Distal pulses are intact.    Neurological: Patient is alert and oriented to person, place and time.    Pupils:  Pupils are equal, round, and reactive to light.    Skin:  Warm.              Chest tube:   Site: Left, Clean, Dry, Intact and Securement device intact  Suction: -20 cm  Air Leak: negative    Results Review:     I reviewed the patient's new clinical results.  I reviewed the patient's new imaging results and agree with the interpretation.  I reviewed the patient's other test results and agree with the interpretation    Imaging Results (Last 24 Hours)     Procedure Component Value Units Date/Time    XR Chest " 1 View [[id number]] Collected: 01/29/23 0555     Updated: 01/29/23 0601    Narrative:      XR CHEST 1 VW-     HISTORY: Female who is 32 years-old,  left pneumothorax, follow-up     TECHNIQUE: Echogenicity of the chest     COMPARISON: 01/28/2023     FINDINGS: Left chest tube appears stable. Heart, mediastinum and  pulmonary vasculature are unremarkable. Previous left pneumothorax  appears mostly resolved, with trace residual left apical pneumothorax.  No focal pulmonary consolidation, pleural effusion, or right  pneumothorax. No acute osseous process.       Impression:      Previous left pneumothorax appears mostly resolved, with trace residual  left apical pneumothorax.      This report was finalized on 1/29/2023 5:58 AM by Dr. Elia Becker M.D.       CT Chest Without Contrast Diagnostic [[id number]] Collected: 01/28/23 2213     Updated: 01/28/23 2237    Narrative:      CT chest without contrast     HISTORY: 32-year-old female with left-sided pneumothorax. Assess for  pulmonary blebs.     TECHNIQUE: CT chest with axial imaging from the thoracic inlet to the  upper abdomen without IV contrast     COMPARISON: [facility] chest x-ray 1/20/2023. No previous chest CT for  comparison.     FINDINGS: There is a left chest drain with pigtail loop anterior to the  lingula. There is a small pneumothorax is demonstrated in the  subpulmonic region courses anterior to the lingula, left upper lobe, and  extends in the left apex. No pulmonary parenchymal abnormality or bleb  is demonstrated. There is no evidence for lung mass or suspicious  nodule. No pleural effusion is evident.     There is no evidence for mediastinal or hilar or axillary lizet  enlargement evaluation limited without IV contrast. No endotracheal or  central intrabronchial lesion is demonstrated. Imaging through the upper  abdomen appears within normal limits.       Impression:      Small left pneumothorax with left chest tube in place. No pulmonary  parenchymal  abnormality or evidence for bleb or lung mass.     Radiation dose reduction techniques were utilized, including automated  exposure control and exposure modulation based on body size.     This report was finalized on 1/28/2023 10:34 PM by Dr. Perico Bai M.D.       XR Chest 1 View [[id number]] Collected: 01/28/23 1043     Updated: 01/28/23 1047    Narrative:      XR CHEST 1 VW-     HISTORY: Female who is 32 years-old,  chest tube placement     TECHNIQUE: Frontal view of the chest     COMPARISON: 01/28/2023     FINDINGS: Left chest tube is seen at the left base. Heart, mediastinum  and pulmonary vasculature are unremarkable. The left lung has  reexpanded, and left pneumothorax is markedly decreased, now measuring  about 2 mm at the apex. No focal pulmonary consolidation, pleural  effusion, or right pneumothorax. No acute osseous process.       Impression:      Chest tube placement with marked decrease of previous left  pneumothorax.     This report was finalized on 1/28/2023 10:44 AM by Dr. Elia Becker M.D.             Lab Results:     Lab Results (last 24 hours)     Procedure Component Value Units Date/Time    Basic Metabolic Panel [[id number]]  (Normal) Collected: 01/29/23 0400    Specimen: Blood Updated: 01/29/23 0446     Glucose 97 mg/dL      BUN 7 mg/dL      Creatinine 0.72 mg/dL      Sodium 140 mmol/L      Potassium 4.1 mmol/L      Chloride 107 mmol/L      CO2 26.0 mmol/L      Calcium 8.8 mg/dL      BUN/Creatinine Ratio 9.7     Anion Gap 7.0 mmol/L      eGFR 113.4 mL/min/1.73     Narrative:      GFR Normal >60  Chronic Kidney Disease <60  Kidney Failure <15      CBC & Differential [[id number]]  (Abnormal) Collected: 01/29/23 0400    Specimen: Blood Updated: 01/29/23 0429    Narrative:      The following orders were created for panel order CBC & Differential.  Procedure                               Abnormality         Status                     ---------                               -----------          ------                     CBC Auto Differential[[id number]]        Abnormal            Final result                 Please view results for these tests on the individual orders.    CBC Auto Differential [[id number]]  (Abnormal) Collected: 01/29/23 0400    Specimen: Blood Updated: 01/29/23 0429     WBC 4.85 10*3/mm3      RBC 4.06 10*6/mm3      Hemoglobin 13.1 g/dL      Hematocrit 38.7 %      MCV 95.3 fL      MCH 32.3 pg      MCHC 33.9 g/dL      RDW 11.5 %      RDW-SD 39.9 fl      MPV 10.2 fL      Platelets 283 10*3/mm3      Neutrophil % 37.9 %      Lymphocyte % 46.4 %      Monocyte % 12.2 %      Eosinophil % 2.9 %      Basophil % 0.4 %      Immature Grans % 0.2 %      Neutrophils, Absolute 1.84 10*3/mm3      Lymphocytes, Absolute 2.25 10*3/mm3      Monocytes, Absolute 0.59 10*3/mm3      Eosinophils, Absolute 0.14 10*3/mm3      Basophils, Absolute 0.02 10*3/mm3      Immature Grans, Absolute 0.01 10*3/mm3      nRBC 0.0 /100 WBC            Assessment & Plan       Pneumothorax, left    Insomnia    Pleuritic chest pain       Assessment & Plan    Left pneumothorax: Patient had acupuncture on 1/26/2023 with increased shortness of breath and chest pain.  Chest x-ray in the ER showed pneumothorax and chest tube was placed.  This appeared resolved on imaging and chest tube was removed at the bedside on 1/27/2023.  Follow-up imaging demonstrated increased pleural separation and a subsequent chest tube was placed at the bedside on 1/28/2023.  Follow-up imaging was again stable with adequate aeration and expansion of the left lung.  CT chest did not reveal pulmonary bleb disease.  She had tiny apical pneumothorax that was also appreciated on today's x-ray.  She does not have air leak on examination.  Chest tube placed to waterseal today.    We will plan to clamp the chest tube tomorrow.  Likely home in 1 to 2 days.    Jj Villar MD  [facility]  01/29/23  09:53 EST

## 2023-01-30 ENCOUNTER — APPOINTMENT (OUTPATIENT)
Dept: GENERAL RADIOLOGY | Facility: HOSPITAL | Age: 33
DRG: 201 | End: 2023-01-30
Payer: COMMERCIAL

## 2023-01-30 PROBLEM — K59.03 DRUG-INDUCED CONSTIPATION: Status: ACTIVE | Noted: 2023-01-30

## 2023-01-30 PROCEDURE — 71045 X-RAY EXAM CHEST 1 VIEW: CPT

## 2023-01-30 PROCEDURE — 99232 SBSQ HOSP IP/OBS MODERATE 35: CPT | Performed by: NURSE PRACTITIONER

## 2023-01-30 PROCEDURE — 25010000002 ENOXAPARIN PER 10 MG: Performed by: INTERNAL MEDICINE

## 2023-01-30 RX ORDER — POLYETHYLENE GLYCOL 3350 17 G/17G
17 POWDER, FOR SOLUTION ORAL DAILY
Status: DISCONTINUED | OUTPATIENT
Start: 2023-01-31 | End: 2023-01-31 | Stop reason: HOSPADM

## 2023-01-30 RX ORDER — AMOXICILLIN 250 MG
1 CAPSULE ORAL 2 TIMES DAILY
Status: DISCONTINUED | OUTPATIENT
Start: 2023-01-30 | End: 2023-01-31 | Stop reason: HOSPADM

## 2023-01-30 RX ADMIN — Medication 10 ML: at 20:38

## 2023-01-30 RX ADMIN — GABAPENTIN 300 MG: 300 CAPSULE ORAL at 16:00

## 2023-01-30 RX ADMIN — ACETAMINOPHEN 1000 MG: 500 TABLET, FILM COATED ORAL at 20:37

## 2023-01-30 RX ADMIN — DOCUSATE SODIUM 50MG AND SENNOSIDES 8.6MG 1 TABLET: 8.6; 5 TABLET, FILM COATED ORAL at 20:37

## 2023-01-30 RX ADMIN — OXYCODONE HYDROCHLORIDE 5 MG: 5 TABLET ORAL at 02:33

## 2023-01-30 RX ADMIN — DOCUSATE SODIUM 50MG AND SENNOSIDES 8.6MG 1 TABLET: 8.6; 5 TABLET, FILM COATED ORAL at 12:32

## 2023-01-30 RX ADMIN — GABAPENTIN 300 MG: 300 CAPSULE ORAL at 20:37

## 2023-01-30 RX ADMIN — ACETAMINOPHEN 1000 MG: 500 TABLET, FILM COATED ORAL at 16:00

## 2023-01-30 RX ADMIN — OXYCODONE HYDROCHLORIDE 5 MG: 5 TABLET ORAL at 18:10

## 2023-01-30 RX ADMIN — DIAZEPAM 2 MG: 2 TABLET ORAL at 16:01

## 2023-01-30 RX ADMIN — ENOXAPARIN SODIUM 40 MG: 100 INJECTION SUBCUTANEOUS at 20:37

## 2023-01-30 RX ADMIN — GABAPENTIN 300 MG: 300 CAPSULE ORAL at 08:24

## 2023-01-30 RX ADMIN — Medication 10 ML: at 08:24

## 2023-01-30 RX ADMIN — ACETAMINOPHEN 1000 MG: 500 TABLET, FILM COATED ORAL at 08:24

## 2023-01-30 RX ADMIN — OXYCODONE HYDROCHLORIDE 5 MG: 5 TABLET ORAL at 22:26

## 2023-01-30 RX ADMIN — DIAZEPAM 2 MG: 2 TABLET ORAL at 08:24

## 2023-01-30 RX ADMIN — POLYETHYLENE GLYCOL 3350 17 G: 17 POWDER, FOR SOLUTION ORAL at 08:27

## 2023-01-30 RX ADMIN — TRAZODONE HYDROCHLORIDE 50 MG: 50 TABLET ORAL at 22:26

## 2023-01-30 NOTE — CASE MANAGEMENT/SOCIAL WORK
Discharge Planning Assessment  [facility]     Patient Name: Lena Sapp  MRN: [id number]  Today's Date: 1/30/2023    Admit Date: 1/26/2023    Plan: Home   Discharge Needs Assessment     Row Name 01/30/23 1258       Living Environment    Current Living Arrangements home    Primary Care Provided by self    Provides Primary Care For no one    Family Caregiver if Needed parent(s)    Quality of Family Relationships supportive       Resource/Environmental Concerns    Resource/Environmental Concerns none       Transition Planning    Patient/Family Anticipates Transition to home with family    Patient/Family Anticipated Services at Transition none       Discharge Needs Assessment    Equipment Currently Used at Home none    Concerns to be Addressed no discharge needs identified    Equipment Needed After Discharge none               Discharge Plan     Row Name 01/30/23 1300       Plan    Plan Home    Patient/Family in Agreement with Plan yes    Plan Comments Met with pt at bedside. Itnroduced self, explained CCP role, facesheet verified. Pt states she is independent with ADLs.  No history of DME, HH, or SNF.  Pt states she does not have PCP currently.  Number to new appt liaison provided.  Plans to return home at discharge, no identified needs.  CCP will follow.  PETE Sandy RN              Continued Care and Services - Admitted Since 1/26/2023    Coordination has not been started for this encounter.       Expected Discharge Date and Time     Expected Discharge Date Expected Discharge Time    Feb 1, 2023          Demographic Summary     Row Name 01/30/23 1239       General Information    Admission Type inpatient    Arrived From home    Referral Source admission list    Reason for Consult discharge planning    Preferred Language English       Contact Information    Permission Granted to Share Info With family/designee  Jackie Sapp               Functional Status    No documentation.                Psychosocial    No  documentation.                Abuse/Neglect    No documentation.                Legal    No documentation.                Substance Abuse    No documentation.                Patient Forms    No documentation.                   Jennifer Sandy RN

## 2023-01-30 NOTE — PROGRESS NOTES
"    Chief Complaint: Left pneumothorax  S/P: Chest tube placement  POD # 2    Subjective:  Symptoms:  Worsening.  She reports shortness of breath and chest pain.  No weakness.    Diet:  Adequate intake.    Activity level: Impaired due to pain.    Pain:  She complains of pain that is moderate.  Pain is partially controlled.        Vital Signs:  Temp:  [97.6 °F (36.4 °C)-98.3 °F (36.8 °C)] 98.1 °F (36.7 °C)  Heart Rate:  [] 103  Resp:  [16-18] 16  BP: ()/(64-82) 99/75    Intake & Output (last day)       01/29 0701  01/30 0700 01/30 0701  01/31 0700    P.O. 1440     Total Intake(mL/kg) 1440 (26.5)     Urine (mL/kg/hr)  0 (0)    Stool  0    Chest Tube 3 0    Total Output 3 0    Net +1437 0          Urine Unmeasured Occurrence 3 x 1 x    Stool Unmeasured Occurrence  1 x          Objective:  General Appearance:  Uncomfortable, well-appearing and in no acute distress.    Vital signs: (most recent): Blood pressure 99/75, pulse 103, temperature 98.1 °F (36.7 °C), temperature source Oral, resp. rate 16, height 165.1 cm (65\"), weight 54.4 kg (120 lb), last menstrual period 12/28/2022, SpO2 100 %.  Vital signs are normal.    Output: Producing urine.    HEENT: Normal HEENT exam.    Lungs:  Normal effort.  She is not in respiratory distress.  There are decreased breath sounds.    Heart: Normal rate.    Abdomen: Abdomen is soft.    Extremities: Normal range of motion.    Pulses: Distal pulses are intact.    Neurological: Patient is alert and oriented to person, place and time.    Pupils:  Pupils are equal, round, and reactive to light.    Skin:  Warm.              Chest tube:   Site: Left, Clean, Dry, Intact and Securement device intact  Suction: waterseal  Air Leak: negative    Results Review:     I reviewed the patient's new clinical results.  I reviewed the patient's new imaging results and agree with the interpretation.  I reviewed the patient's other test results and agree with the interpretation    Imaging " Results (Last 24 Hours)     Procedure Component Value Units Date/Time    XR Chest 1 View [[id number]] Collected: 01/30/23 0826     Updated: 01/30/23 0831    Narrative:      XR CHEST 1 VW-     HISTORY: Female who is 32 years-old,  left pneumothorax     TECHNIQUE: Frontal view of the chest     COMPARISON: 01/29/2023     FINDINGS: Left chest tube is more peripherally positioned. Heart,  mediastinum and pulmonary vasculature are unremarkable. Previous left  pneumothorax appears increased, now measuring 8mm thickness. No focal  pulmonary consolidation, pleural effusion, or right pneumothorax. No  acute osseous process.       Impression:      Small left pneumothorax has increased, 8 mm thickness.     This report was finalized on 1/30/2023 8:28 AM by Dr. Elia Becker M.D.             Lab Results:     Lab Results (last 24 hours)     ** No results found for the last 24 hours. **           Assessment & Plan       Pneumothorax, left    Insomnia    Pleuritic chest pain       Assessment & Plan    Left pneumothorax: Patient had acupuncture on 1/26/2023 with increased shortness of breath and chest pain.  Chest x-ray in the ER showed pneumothorax and chest tube was placed.  This appeared resolved on imaging and chest tube was removed at the bedside on 1/27/2023.  Follow-up imaging demonstrated increased pleural separation and a subsequent chest tube was placed at the bedside on 1/28/2023.  Follow-up imaging was again stable with adequate aeration and expansion of the left lung.  CT chest did not reveal pulmonary bleb disease.  Patient has tolerated chest tube to waterseal without recurrence of pneumothorax today.  No airleak to chest tube with forceful cough.  We will try clamping her chest tube today and recheck a chest x-ray in the morning.  Anticipate chest tube removal tomorrow pending stable imaging.      Faiza Valdovinos DNP, APRN  [facility]  01/30/23  13:17 EST

## 2023-01-30 NOTE — PLAN OF CARE
Problem: Pain Acute  Goal: Acceptable Pain Control and Functional Ability  Outcome: Ongoing, Progressing  Intervention: Optimize Psychosocial Wellbeing  Recent Flowsheet Documentation  Taken 1/30/2023 0800 by Yelena Jay RN  Diversional Activities: television     Problem: Pain Acute  Goal: Acceptable Pain Control and Functional Ability  Intervention: Optimize Psychosocial Wellbeing  Recent Flowsheet Documentation  Taken 1/30/2023 0800 by Yelena Jay, RN  Diversional Activities: television   Goal Outcome Evaluation:  Plan of Care Reviewed With: patient        Progress: improving  Outcome Evaluation: VSS. C/o discomfort managed with prn valium. CT clamped, pt tolerating with no S/S of distress or SOA. Plans for discharge tomorrow. Will monitor.

## 2023-01-30 NOTE — PROGRESS NOTES
Name: Lena Sapp ADMIT: 2023   : 1990  PCP: Provider, No Known    MRN: [id number] LOS: 2 days   AGE/SEX: 32 y.o. female  ROOM: [facility] ([facility]     Subjective   Subjective   No acute events overnight.  Patient resting comfortably bed.  Family at bedside.  States her last bowel movement was on Thursday.  Pain is well controlled on current regimen.  Eating okay.    Review of Systems   Constitutional: Negative for chills and fever.   Respiratory: Negative for cough and shortness of breath.    Cardiovascular: Negative for chest pain and leg swelling.   Gastrointestinal: Positive for constipation. Negative for abdominal pain, diarrhea and nausea.     As above     Objective   Objective   Vital Signs  Temp:  [97.6 °F (36.4 °C)-98.3 °F (36.8 °C)] 98.1 °F (36.7 °C)  Heart Rate:  [] 103  Resp:  [16-18] 16  BP: ()/(64-82) 99/75  SpO2:  [92 %-100 %] 100 %  on   ;   Device (Oxygen Therapy): room air  Body mass index is 19.97 kg/m².  Physical Exam  Constitutional:       General: She is not in acute distress.     Appearance: She is not diaphoretic.   HENT:      Mouth/Throat:      Mouth: Mucous membranes are moist.   Eyes:      Extraocular Movements: Extraocular movements intact.      Conjunctiva/sclera: Conjunctivae normal.   Cardiovascular:      Rate and Rhythm: Normal rate and regular rhythm.   Pulmonary:      Effort: Pulmonary effort is normal. No respiratory distress.      Comments: Chest tube to waterseal  Abdominal:      General: Abdomen is flat.      Palpations: Abdomen is soft.      Tenderness: There is no guarding.   Musculoskeletal:         General: No swelling or deformity. Normal range of motion.   Skin:     General: Skin is warm and dry.   Neurological:      General: No focal deficit present.      Mental Status: She is alert. Mental status is at baseline.         Results Review     I reviewed the patient's new clinical results.  Results from last 7 days   Lab Units 23  0400  01/26/23 1958   WBC 10*3/mm3 4.85 5.80   HEMOGLOBIN g/dL 13.1 13.2   PLATELETS 10*3/mm3 283 280     Results from last 7 days   Lab Units 01/29/23  0400 01/26/23 1958   SODIUM mmol/L 140 140   POTASSIUM mmol/L 4.1 4.0   CHLORIDE mmol/L 107 106   CO2 mmol/L 26.0 24.7   BUN mg/dL 7 11   CREATININE mg/dL 0.72 0.77   GLUCOSE mg/dL 97 82   Estimated Creatinine Clearance: 95.4 mL/min (by C-G formula based on SCr of 0.72 mg/dL).  Results from last 7 days   Lab Units 01/26/23 1958   ALBUMIN g/dL 4.6   BILIRUBIN mg/dL 0.2   ALK PHOS U/L 57   AST (SGOT) U/L 15   ALT (SGPT) U/L 14     Results from last 7 days   Lab Units 01/29/23  0400 01/26/23 1958   CALCIUM mg/dL 8.8 9.1   ALBUMIN g/dL  --  4.6     No results found for: COVID19  No results found for: HGBA1C, POCGLU    XR Chest 1 View  Narrative: XR CHEST 1 VW-     HISTORY: Female who is 32 years-old,  left pneumothorax     TECHNIQUE: Frontal view of the chest     COMPARISON: 01/29/2023     FINDINGS: Left chest tube is more peripherally positioned. Heart,  mediastinum and pulmonary vasculature are unremarkable. Previous left  pneumothorax appears increased, now measuring 8mm thickness. No focal  pulmonary consolidation, pleural effusion, or right pneumothorax. No  acute osseous process.     Impression: Small left pneumothorax has increased, 8 mm thickness.     This report was finalized on 1/30/2023 8:28 AM by Dr. Elia Becker M.D.       I reviewed the patient's daily medications.  Scheduled Medications  acetaminophen, 1,000 mg, Oral, TID  enoxaparin, 40 mg, Subcutaneous, Q24H  gabapentin, 300 mg, Oral, TID  [START ON 1/31/2023] polyethylene glycol, 17 g, Oral, Daily  senna-docusate sodium, 1 tablet, Oral, BID  sodium chloride, 10 mL, Intravenous, Q12H  traZODone, 50 mg, Oral, Nightly    Infusions   Diet  Diet: Regular/House Diet; Texture: Regular Texture (IDDSI 7); Fluid Consistency: Thin (IDDSI 0)         I have personally reviewed:  [x]  Laboratory   []   Microbiology   [x]  Radiology   []  EKG/Telemetry   []  Cardiology/Vascular   []  Pathology   [x]  Records     Assessment/Plan     Active Hospital Problems    Diagnosis  POA   • **Pneumothorax, left [J93.9]  Yes   • Drug-induced constipation [K59.03]  Unknown   • Insomnia [G47.00]  Yes   • Pleuritic chest pain [R07.81]  Yes      Resolved Hospital Problems   No resolved problems to display.       32 y.o. female admitted with Pneumothorax, left.    Left-sided pneumothorax from acupuncture  -Patient had chest tube that was removed on 1/27, however, unfortunately had to be replaced on 1/28 when imaging revealed a worsening pneumothorax  -Chest tube clamped today by thoracic surgery, hopeful to pull tomorrow if imaging stable  -Continue p.o. and IV pain meds as well as Valium and gabapentin    Constipation  -Last bowel movement was Thursday  -Start scheduled Doc senna and MiraLAX    Insomnia-continue home trazodone    Lovenox 40 mg SC daily for DVT prophylaxis.  Full code.  Discussed with patient, family and nursing staff.  Anticipate discharge home with family when cleared by consultants.  Possibly tomorrow      Ronald Soto MD  [facility]  01/30/23  13:54 EST

## 2023-01-30 NOTE — PLAN OF CARE
Goal Outcome Evaluation:  Plan of Care Reviewed With: patient     VSS. Chest tube to water seal no air leak. Pain treated with prn oxy and morphine. Up ad annmarie.     Progress: improving

## 2023-01-30 NOTE — PAYOR COMM NOTE
"Lena Batista (32 y.o. Female)   PLEASE SEE ATTACHED FOR INPT AUTH       PT WAS OBSERVATION ON 1/26 AND CHANGED TO INPT ON 1/28      PT ID [id number]      PLEASE CALL TERE CARROLL RN/ DEPT @ [phone]  OR FAX  DEPARTMENT @  [phone]    THANK YOU   TERE CARROLL RN  [facility]           Date of Birth   1990    Social Security Number       Address   [address] [place] [address]  [facility] [address]    Home Phone   [phone]    MRN   [id number]       Congregation   None    Marital Status   Single                            Admission Date   1/26/23    Admission Type   Emergency    Admitting Provider   Napoleon Conteh MD    Attending Provider   Ronald Soto MD    Department, Room/Bed   [address], E560/1       Discharge Date       Discharge Disposition       Discharge Destination                               Attending Provider: Ronald Soto MD    Allergies: Dilaudid [Hydromorphone Hcl]    Isolation: None   Infection: None   Code Status: CPR    Ht: 165.1 cm (65\")   Wt: 54.4 kg (120 lb)    Admission Cmt: None   Principal Problem: Pneumothorax, left [J93.9]                 Active Insurance as of 1/26/2023     Primary Coverage     Payor Plan Insurance Group Employer/Plan Group    [facility] / [facility]      Payor Plan Address Payor Plan Phone Number Payor Plan Fax Number Effective Dates    [address]   1/26/2023 - None Entered    [facility] [phone]       Subscriber Name Subscriber Birth Date Member ID       LENA BATISTA 1990 [id number]                 Emergency Contacts      (Rel.) Home Phone Work Phone Mobile Phone    PATTY BATISTA (Mother) -- -- [phone]            [place]: [facility] [id number]  Tax ID [id number]     History & Physical      Napoleon Conteh MD at 01/26/23 2203              Patient Name:  Lena Batista  YOB: 1990  MRN:  [id number]  Admit Date:  1/26/2023  Patient Care " Team:  Provider, No Known as PCP - General  Provider, No Known as PCP - Family Medicine      Subjective    History Present Illness     Chief Complaint   Patient presents with   • Back Pain         This is a 32-year-old woman with a past medical history of ADHD and mood disorder comes to the hospital after experiencing dyspnea after undergoing acupuncture this morning.  Into the hospital for further evaluation management where an x-ray revealed a left-sided pneumothorax with an apical and basilar component.  She had a chest tube placed on the ER and was subsequently admitted.  Laboratory work-up did not reveal any abnormalities.  Denies any fevers, chills, nausea, vomiting, chest pain.        Review of Systems   Constitutional: Negative for chills and fever.   HENT: Negative for dental problem and ear pain.    Respiratory: Positive for shortness of breath. Negative for wheezing.    Cardiovascular: Negative for chest pain and palpitations.   Gastrointestinal: Negative for abdominal pain and blood in stool.   Endocrine: Negative for cold intolerance and polydipsia.   Musculoskeletal: Negative for back pain and joint swelling.   Skin: Negative for color change and rash.   Neurological: Negative for weakness and headaches.   Psychiatric/Behavioral: Negative for agitation and decreased concentration.        Personal History     Past Medical History:   Diagnosis Date   • Kyphosis      Past Surgical History:   Procedure Laterality Date   • CYST REMOVAL      uterine cyst     History reviewed. No pertinent family history.  Social History     Tobacco Use   • Smoking status: Never   Vaping Use   • Vaping Use: Never used   Substance Use Topics   • Alcohol use: Never   • Drug use: Never     No current facility-administered medications on file prior to encounter.     Current Outpatient Medications on File Prior to Encounter   Medication Sig Dispense Refill   • Qelbree 200 MG capsule sustained-release 24 hr Take 200 mg by mouth  Daily.     • traZODone (DESYREL) 50 MG tablet Take 50 mg by mouth Every Night.       No Known Allergies    Objective     Objective     Vital Signs  Temp:  [100 °F (37.8 °C)] 100 °F (37.8 °C)  Heart Rate:  [] 92  Resp:  [14-18] 16  BP: ()/(58-85) 104/78  SpO2:  [99 %-100 %] 100 %  on   ;   Device (Oxygen Therapy): room air  Body mass index is 19.97 kg/m².    Physical Exam  Constitutional:       Appearance: Normal appearance.   HENT:      Head: Normocephalic and atraumatic.   Eyes:      Extraocular Movements: Extraocular movements intact.      Pupils: Pupils are equal, round, and reactive to light.   Cardiovascular:      Rate and Rhythm: Normal rate and regular rhythm.   Pulmonary:      Effort: Pulmonary effort is normal. No respiratory distress.      Comments: Chest tube noted   Abdominal:      General: There is no distension.      Palpations: Abdomen is soft.      Tenderness: There is no abdominal tenderness.   Musculoskeletal:      Right lower leg: No edema.      Left lower leg: No edema.   Skin:     General: Skin is warm and dry.   Neurological:      General: No focal deficit present.      Mental Status: She is alert and oriented to person, place, and time.         Results Review:  I reviewed the patient's new clinical results.  I reviewed the patient's new imaging results and agree with the interpretation.  I reviewed the patient's other test results and agree with the interpretation  I personally viewed and interpreted the patient's EKG/Telemetry data  Discussed with ED provider.    Lab Results (last 24 hours)     Procedure Component Value Units Date/Time    CBC & Differential [[id number]]  (Abnormal) Collected: 01/26/23 1958    Specimen: Blood Updated: 01/26/23 2022    Narrative:      The following orders were created for panel order CBC & Differential.  Procedure                               Abnormality         Status                     ---------                               -----------          ------                     CBC Auto Differential[[id number]]        Abnormal            Final result                 Please view results for these tests on the individual orders.    Comprehensive Metabolic Panel [[id number]] Collected: 01/26/23 1958    Specimen: Blood Updated: 01/26/23 2057     Glucose 82 mg/dL      BUN 11 mg/dL      Creatinine 0.77 mg/dL      Sodium 140 mmol/L      Potassium 4.0 mmol/L      Comment: Slight hemolysis detected by analyzer. Results may be affected.        Chloride 106 mmol/L      CO2 24.7 mmol/L      Calcium 9.1 mg/dL      Total Protein 6.9 g/dL      Albumin 4.6 g/dL      ALT (SGPT) 14 U/L      AST (SGOT) 15 U/L      Comment: Slight hemolysis detected by analyzer. Results may be affected.        Alkaline Phosphatase 57 U/L      Total Bilirubin 0.2 mg/dL      Globulin 2.3 gm/dL      A/G Ratio 2.0 g/dL      BUN/Creatinine Ratio 14.3     Anion Gap 9.3 mmol/L      eGFR 104.6 mL/min/1.73     Narrative:      GFR Normal >60  Chronic Kidney Disease <60  Kidney Failure <15      CBC Auto Differential [[id number]]  (Abnormal) Collected: 01/26/23 1958    Specimen: Blood Updated: 01/26/23 2022     WBC 5.80 10*3/mm3      RBC 4.16 10*6/mm3      Hemoglobin 13.2 g/dL      Hematocrit 38.1 %      MCV 91.6 fL      MCH 31.7 pg      MCHC 34.6 g/dL      RDW 11.5 %      RDW-SD 39.0 fl      MPV 10.4 fL      Platelets 280 10*3/mm3      Neutrophil % 61.2 %      Lymphocyte % 26.9 %      Monocyte % 10.2 %      Eosinophil % 0.9 %      Basophil % 0.5 %      Immature Grans % 0.3 %      Neutrophils, Absolute 3.55 10*3/mm3      Lymphocytes, Absolute 1.56 10*3/mm3      Monocytes, Absolute 0.59 10*3/mm3      Eosinophils, Absolute 0.05 10*3/mm3      Basophils, Absolute 0.03 10*3/mm3      Immature Grans, Absolute 0.02 10*3/mm3      nRBC 0.0 /100 WBC           Imaging Results (Last 24 Hours)     Procedure Component Value Units Date/Time    XR Chest 1 View [[id number]] Collected: 01/26/23 2121     Updated: 01/26/23 2126     Narrative:      SINGLE VIEW OF THE CHEST     HISTORY: Chest tube placement     COMPARISON: 01/26/2023     FINDINGS:  Left-sided pleural drainage catheter has been placed, with subsequent  reexpansion of the left lung. There is a residual tiny left apical  pneumothorax. Heart size is within normal limits. Right lung appears  clear.       Impression:      Interval placement of a left-sided pleural drainage catheter. Residual  tiny left apical pneumothorax noted.     This report was finalized on 1/26/2023 9:23 PM by Dr. Jacinda Hamlin M.D.       XR Chest 2 View [[id number]] Collected: 01/26/23 1922     Updated: 01/26/23 1929    Narrative:      XR CHEST 2 VW-     Clinical: Chest and back pain 32-year-old female     FINDINGS: There is a left-sided pneumothorax, estimated size 20%. There  is an apical and basilar component. No significant mediastinal shift.  Heart size within normal limits. No mediastinal or hilar mass. The right  lung is clear.     No pleural effusion nor vascular congestion seen.     CONCLUSION: Left-sided pneumothorax, estimated size 20%.     FINDINGS called Dr. Garza at the time of completion 7:30 PM.     This report was finalized on 1/26/2023 7:26 PM by Dr. Levon Cole M.D.                 No orders to display       Assessment/Plan     Active Hospital Problems    Diagnosis  POA   • **Pneumothorax, left [J93.9]  Yes      Resolved Hospital Problems   No resolved problems to display.       32-year-old woman who came to the hospital after experiencing dyspnea after acupuncture.  Diagnosed with pneumothorax and had chest tube placed.    Left-sided pneumothorax  Chest tube placed  Thoracic surgery consulted  Monitor oxygenation    Insomnia  Resume home trazodone       · I discussed the patient's findings and my recommendations with patient and ED provider.    VTE Prophylaxis - SCDs.  Code Status - Full code.       Napoleon Conteh MD  [facility]  01/26/23  22:04  EST      Electronically signed by Napoleon Cnoteh MD at 23 2246       Lines, Drains & Airways     Active LDAs     Name Placement date Placement time Site Days    Peripheral IV 23 1800 Left Antecubital 23  1800  Antecubital  2    Chest Tube 1 Left Midaxillary 23  1020  Midaxillary  1                   Physician Progress Notes (last 72 hours)      Julio William MD at 23 1531              Name: Lena Sapp ADMIT: 2023   : 1990  PCP: Provider, No Known    MRN: [id number] LOS: 1 days   AGE/SEX: 32 y.o. female  ROOM: 60/     Subjective   Subjective      Patient is sitting up on the bed and has no specific complaints.  Her chest tube insertion site pain is reasonably well controlled.  Denies nausea, vomiting, palpitations, dizziness.      Objective   Objective   Vital Signs  Temp:  [98 °F (36.7 °C)-98.9 °F (37.2 °C)] 98.3 °F (36.8 °C)  Heart Rate:  [] 96  Resp:  [16-18] 16  BP: (107-135)/(60-81) 135/79  SpO2:  [95 %-99 %] 97 %  on   ;   Device (Oxygen Therapy): room air  Body mass index is 19.97 kg/m².  Physical Exam  Constitutional:       General: She is in acute distress.   HENT:      Head: Normocephalic and atraumatic.      Mouth/Throat:      Mouth: Mucous membranes are moist.   Eyes:      Extraocular Movements: Extraocular movements intact.      Conjunctiva/sclera: Conjunctivae normal.      Pupils: Pupils are equal, round, and reactive to light.   Cardiovascular:      Rate and Rhythm: Normal rate and regular rhythm.      Pulses: Normal pulses.      Heart sounds: Normal heart sounds.   Pulmonary:      Effort: No respiratory distress.      Breath sounds: Normal breath sounds.      Comments: Diminished breath sounds on the left, chest tube noted on the left side  Abdominal:      General: Bowel sounds are normal. There is no distension.      Palpations: Abdomen is soft.      Tenderness: There is no abdominal tenderness.   Musculoskeletal:      Cervical back:  Normal range of motion and neck supple.      Right lower leg: No edema.      Left lower leg: No edema.   Skin:     General: Skin is warm and dry.   Neurological:      General: No focal deficit present.      Mental Status: She is alert and oriented to person, place, and time.   Psychiatric:         Mood and Affect: Mood normal.         Behavior: Behavior normal.       Results Review     I reviewed the patient's new clinical results.  Results from last 7 days   Lab Units 01/29/23  0400 01/26/23 1958   WBC 10*3/mm3 4.85 5.80   HEMOGLOBIN g/dL 13.1 13.2   PLATELETS 10*3/mm3 283 280     Results from last 7 days   Lab Units 01/29/23  0400 01/26/23 1958   SODIUM mmol/L 140 140   POTASSIUM mmol/L 4.1 4.0   CHLORIDE mmol/L 107 106   CO2 mmol/L 26.0 24.7   BUN mg/dL 7 11   CREATININE mg/dL 0.72 0.77   GLUCOSE mg/dL 97 82   EGFR mL/min/1.73 113.4 104.6     Results from last 7 days   Lab Units 01/26/23 1958   ALBUMIN g/dL 4.6   BILIRUBIN mg/dL 0.2   ALK PHOS U/L 57   AST (SGOT) U/L 15   ALT (SGPT) U/L 14     Results from last 7 days   Lab Units 01/29/23  0400 01/26/23 1958   CALCIUM mg/dL 8.8 9.1   ALBUMIN g/dL  --  4.6       No results found for: HGBA1C, POCGLU    CT Chest Without Contrast Diagnostic    Result Date: 1/28/2023  Small left pneumothorax with left chest tube in place. No pulmonary parenchymal abnormality or evidence for bleb or lung mass.  Radiation dose reduction techniques were utilized, including automated exposure control and exposure modulation based on body size.  This report was finalized on 1/28/2023 10:34 PM by Dr. Perico Bai M.D.      XR Chest 1 View    Result Date: 1/29/2023  Previous left pneumothorax appears mostly resolved, with trace residual left apical pneumothorax.  This report was finalized on 1/29/2023 5:58 AM by Dr. Elia Becker M.D.      XR Chest 1 View    Result Date: 1/28/2023  Chest tube placement with marked decrease of previous left pneumothorax.  This report was  finalized on 1/28/2023 10:44 AM by Dr. Elia Becker M.D.      XR Chest 1 View    Result Date: 1/28/2023  Further interval increase in left pneumothorax.  Discussed by telephone with patient's nurse, John, at time of interpretation, 0617, 01/28/2023.  This report was finalized on 1/28/2023 6:23 AM by Dr. Elia Becker M.D.      I have personally reviewed all medications:  Scheduled Medications  acetaminophen, 1,000 mg, Oral, TID  enoxaparin, 40 mg, Subcutaneous, Q24H  gabapentin, 300 mg, Oral, TID  sodium chloride, 10 mL, Intravenous, Q12H  traZODone, 50 mg, Oral, Nightly    Infusions   Diet  Diet: Regular/House Diet; Texture: Regular Texture (IDDSI 7); Fluid Consistency: Thin (IDDSI 0)    I have personally reviewed:  [x]  Laboratory   [x]  Microbiology   [x]  Radiology   [x]  EKG/Telemetry  [x]  Cardiology/Vascular   [x]  Pathology    [x]  Records      Assessment/Plan     Active Hospital Problems    Diagnosis  POA   • **Pneumothorax, left [J93.9]  Yes   • Insomnia [G47.00]  Yes   • Pleuritic chest pain [R07.81]  Yes      Resolved Hospital Problems   No resolved problems to display.       32 y.o. female admitted with Pneumothorax, left.      1. Left-sided pneumothorax status post acute puncture , chest tube was initially discontinued on 1/27/2023  and repeat chest x-ray on 1/28/2023 revealed worsening pneumothorax therefore underwent placement of chest tube again by CT surgery.  Continue with analgesics.     2. On Lovenox for DVT prophylaxis.      3. Code status is full code.    Copied text on this note has been reviewed by me on 1/29/2023    Julio William MD  [facility]  01/29/23  15:31 EST        Electronically signed by Julio William MD at 01/29/23 1471     jJ Villar MD at 01/29/23 5195              Chief Complaint: Left pneumothorax  S/P: Chest tube placement  POD # 1    Subjective:  Symptoms:  Worsening.  She reports shortness of breath and chest pain.    Diet:   "Adequate intake.    Activity level: Impaired due to pain.    Pain:  She complains of pain that is moderate.  Pain is partially controlled.        Vital Signs:  Temp:  [98 °F (36.7 °C)-99 °F (37.2 °C)] 98.4 °F (36.9 °C)  Heart Rate:  [] 96  Resp:  [16-18] 16  BP: (107-131)/(60-82) 107/60    Intake & Output (last day)       01/28 0701  01/29 0700 01/29 0701  01/30 0700    P.O. 960     Total Intake(mL/kg) 960 (17.6)     Urine (mL/kg/hr) 0 (0)     Chest Tube 19     Total Output 19     Net +941           Urine Unmeasured Occurrence 2 x           Objective:  General Appearance:  Uncomfortable, well-appearing and in no acute distress.    Vital signs: (most recent): Blood pressure 107/60, pulse 96, temperature 98.4 °F (36.9 °C), temperature source Oral, resp. rate 16, height 165.1 cm (65\"), weight 54.4 kg (120 lb), last menstrual period 12/28/2022, SpO2 95 %.  Vital signs are normal.    Output: Producing urine.    HEENT: Normal HEENT exam.    Lungs:  Normal effort.  She is not in respiratory distress.  There are decreased breath sounds.    Heart: Normal rate.    Abdomen: Abdomen is soft.    Extremities: Normal range of motion.    Pulses: Distal pulses are intact.    Neurological: Patient is alert and oriented to person, place and time.    Pupils:  Pupils are equal, round, and reactive to light.    Skin:  Warm.              Chest tube:   Site: Left, Clean, Dry, Intact and Securement device intact  Suction: -20 cm  Air Leak: negative    Results Review:     I reviewed the patient's new clinical results.  I reviewed the patient's new imaging results and agree with the interpretation.  I reviewed the patient's other test results and agree with the interpretation    Imaging Results (Last 24 Hours)     Procedure Component Value Units Date/Time    XR Chest 1 View [[id number]] Collected: 01/29/23 0555     Updated: 01/29/23 0601    Narrative:      XR CHEST 1 VW-     HISTORY: Female who is 32 years-old,  left pneumothorax, " follow-up     TECHNIQUE: Echogenicity of the chest     COMPARISON: 01/28/2023     FINDINGS: Left chest tube appears stable. Heart, mediastinum and  pulmonary vasculature are unremarkable. Previous left pneumothorax  appears mostly resolved, with trace residual left apical pneumothorax.  No focal pulmonary consolidation, pleural effusion, or right  pneumothorax. No acute osseous process.       Impression:      Previous left pneumothorax appears mostly resolved, with trace residual  left apical pneumothorax.      This report was finalized on 1/29/2023 5:58 AM by Dr. Elia Becker M.D.       CT Chest Without Contrast Diagnostic [[id number]] Collected: 01/28/23 2213     Updated: 01/28/23 2237    Narrative:      CT chest without contrast     HISTORY: 32-year-old female with left-sided pneumothorax. Assess for  pulmonary blebs.     TECHNIQUE: CT chest with axial imaging from the thoracic inlet to the  upper abdomen without IV contrast     COMPARISON: [facility] chest x-ray 1/20/2023. No previous chest CT for  comparison.     FINDINGS: There is a left chest drain with pigtail loop anterior to the  lingula. There is a small pneumothorax is demonstrated in the  subpulmonic region courses anterior to the lingula, left upper lobe, and  extends in the left apex. No pulmonary parenchymal abnormality or bleb  is demonstrated. There is no evidence for lung mass or suspicious  nodule. No pleural effusion is evident.     There is no evidence for mediastinal or hilar or axillary lizet  enlargement evaluation limited without IV contrast. No endotracheal or  central intrabronchial lesion is demonstrated. Imaging through the upper  abdomen appears within normal limits.       Impression:      Small left pneumothorax with left chest tube in place. No pulmonary  parenchymal abnormality or evidence for bleb or lung mass.     Radiation dose reduction techniques were utilized, including automated  exposure control and exposure modulation based  on body size.     This report was finalized on 1/28/2023 10:34 PM by Dr. Perico Bai M.D.       XR Chest 1 View [[id number]] Collected: 01/28/23 1043     Updated: 01/28/23 1047    Narrative:      XR CHEST 1 VW-     HISTORY: Female who is 32 years-old,  chest tube placement     TECHNIQUE: Frontal view of the chest     COMPARISON: 01/28/2023     FINDINGS: Left chest tube is seen at the left base. Heart, mediastinum  and pulmonary vasculature are unremarkable. The left lung has  reexpanded, and left pneumothorax is markedly decreased, now measuring  about 2 mm at the apex. No focal pulmonary consolidation, pleural  effusion, or right pneumothorax. No acute osseous process.       Impression:      Chest tube placement with marked decrease of previous left  pneumothorax.     This report was finalized on 1/28/2023 10:44 AM by Dr. Elia Becker M.D.             Lab Results:     Lab Results (last 24 hours)     Procedure Component Value Units Date/Time    Basic Metabolic Panel [[id number]]  (Normal) Collected: 01/29/23 0400    Specimen: Blood Updated: 01/29/23 0446     Glucose 97 mg/dL      BUN 7 mg/dL      Creatinine 0.72 mg/dL      Sodium 140 mmol/L      Potassium 4.1 mmol/L      Chloride 107 mmol/L      CO2 26.0 mmol/L      Calcium 8.8 mg/dL      BUN/Creatinine Ratio 9.7     Anion Gap 7.0 mmol/L      eGFR 113.4 mL/min/1.73     Narrative:      GFR Normal >60  Chronic Kidney Disease <60  Kidney Failure <15      CBC & Differential [[id number]]  (Abnormal) Collected: 01/29/23 0400    Specimen: Blood Updated: 01/29/23 0429    Narrative:      The following orders were created for panel order CBC & Differential.  Procedure                               Abnormality         Status                     ---------                               -----------         ------                     CBC Auto Differential[[id number]]        Abnormal            Final result                 Please view results for these tests on the  individual orders.    CBC Auto Differential [[id number]]  (Abnormal) Collected: 23 040    Specimen: Blood Updated: 23     WBC 4.85 10*3/mm3      RBC 4.06 10*6/mm3      Hemoglobin 13.1 g/dL      Hematocrit 38.7 %      MCV 95.3 fL      MCH 32.3 pg      MCHC 33.9 g/dL      RDW 11.5 %      RDW-SD 39.9 fl      MPV 10.2 fL      Platelets 283 10*3/mm3      Neutrophil % 37.9 %      Lymphocyte % 46.4 %      Monocyte % 12.2 %      Eosinophil % 2.9 %      Basophil % 0.4 %      Immature Grans % 0.2 %      Neutrophils, Absolute 1.84 10*3/mm3      Lymphocytes, Absolute 2.25 10*3/mm3      Monocytes, Absolute 0.59 10*3/mm3      Eosinophils, Absolute 0.14 10*3/mm3      Basophils, Absolute 0.02 10*3/mm3      Immature Grans, Absolute 0.01 10*3/mm3      nRBC 0.0 /100 WBC            Assessment & Plan       Pneumothorax, left    Insomnia    Pleuritic chest pain       Assessment & Plan    Left pneumothorax: Patient had acupuncture on 2023 with increased shortness of breath and chest pain.  Chest x-ray in the ER showed pneumothorax and chest tube was placed.  This appeared resolved on imaging and chest tube was removed at the bedside on 2023.  Follow-up imaging demonstrated increased pleural separation and a subsequent chest tube was placed at the bedside on 2023.  Follow-up imaging was again stable with adequate aeration and expansion of the left lung.  CT chest did not reveal pulmonary bleb disease.  She had tiny apical pneumothorax that was also appreciated on today's x-ray.  She does not have air leak on examination.  Chest tube placed to waterseal today.    We will plan to clamp the chest tube tomorrow.  Likely home in 1 to 2 days.    Jj Villar MD  [facility]  23  09:53 EST            Electronically signed by Jj Villar MD at 23 0995     Julio William MD at 23 2845              Name: Lena Sapp ADMIT: 2023   : 1990  PCP: Provider, No  Known    MRN: [id number] LOS: 0 days   AGE/SEX: 32 y.o. female  ROOM: E560/1     Subjective   Subjective        Patient is lying in bed does not appear to be any distress.  No new events overnight.  Denies nausea, vomiting abdominal pain.  Does complain of chest pain secondary to chest tube placement on the left side.      Objective   Objective   Vital Signs  Temp:  [98.1 °F (36.7 °C)-98.8 °F (37.1 °C)] 98.5 °F (36.9 °C)  Heart Rate:  [] 104  Resp:  [16-18] 18  BP: (104-126)/(68-82) 126/82  SpO2:  [97 %-100 %] 99 %  on   ;      Body mass index is 19.97 kg/m².  Physical Exam  Constitutional:       General: She is in acute distress.   HENT:      Head: Normocephalic and atraumatic.      Mouth/Throat:      Mouth: Mucous membranes are moist.   Eyes:      Extraocular Movements: Extraocular movements intact.      Conjunctiva/sclera: Conjunctivae normal.      Pupils: Pupils are equal, round, and reactive to light.   Cardiovascular:      Rate and Rhythm: Normal rate and regular rhythm.      Pulses: Normal pulses.      Heart sounds: Normal heart sounds.   Pulmonary:      Effort: No respiratory distress.      Breath sounds: Normal breath sounds.      Comments: Diminished breath sounds on the left, chest tube noted on the left side  Abdominal:      General: Bowel sounds are normal. There is no distension.      Palpations: Abdomen is soft.      Tenderness: There is no abdominal tenderness.   Musculoskeletal:      Cervical back: Normal range of motion and neck supple.      Right lower leg: No edema.      Left lower leg: No edema.   Skin:     General: Skin is warm and dry.   Neurological:      General: No focal deficit present.      Mental Status: She is alert and oriented to person, place, and time.   Psychiatric:         Mood and Affect: Mood normal.         Behavior: Behavior normal.       Results Review     I reviewed the patient's new clinical results.  Results from last 7 days   Lab Units 01/26/23 1958   WBC 10*3/mm3  5.80   HEMOGLOBIN g/dL 13.2   PLATELETS 10*3/mm3 280     Results from last 7 days   Lab Units 01/26/23 1958   SODIUM mmol/L 140   POTASSIUM mmol/L 4.0   CHLORIDE mmol/L 106   CO2 mmol/L 24.7   BUN mg/dL 11   CREATININE mg/dL 0.77   GLUCOSE mg/dL 82   EGFR mL/min/1.73 104.6     Results from last 7 days   Lab Units 01/26/23 1958   ALBUMIN g/dL 4.6   BILIRUBIN mg/dL 0.2   ALK PHOS U/L 57   AST (SGOT) U/L 15   ALT (SGPT) U/L 14     Results from last 7 days   Lab Units 01/26/23 1958   CALCIUM mg/dL 9.1   ALBUMIN g/dL 4.6       No results found for: HGBA1C, POCGLU    XR Chest 1 View    Result Date: 1/28/2023  Chest tube placement with marked decrease of previous left pneumothorax.  This report was finalized on 1/28/2023 10:44 AM by Dr. Elia Becker M.D.      XR Chest 1 View    Result Date: 1/28/2023  Further interval increase in left pneumothorax.  Discussed by telephone with patient's nurse, John, at time of interpretation, 0617, 01/28/2023.  This report was finalized on 1/28/2023 6:23 AM by Dr. Elia Becker M.D.      XR Chest 1 View    Result Date: 1/26/2023  Interval placement of a left-sided pleural drainage catheter. Residual tiny left apical pneumothorax noted.  This report was finalized on 1/26/2023 9:23 PM by Dr. Jacinda Hamlin M.D.      I have personally reviewed all medications:  Scheduled Medications  acetaminophen, 1,000 mg, Oral, TID  enoxaparin, 40 mg, Subcutaneous, Q24H  gabapentin, 300 mg, Oral, TID  sodium chloride, 10 mL, Intravenous, Q12H  traZODone, 50 mg, Oral, Nightly    Infusions   Diet  Diet: Regular/House Diet; Texture: Regular Texture (IDDSI 7); Fluid Consistency: Thin (IDDSI 0)    I have personally reviewed:  [x]  Laboratory   [x]  Microbiology   [x]  Radiology   [x]  EKG/Telemetry  [x]  Cardiology/Vascular   [x]  Pathology    [x]  Records      Assessment/Plan     Active Hospital Problems    Diagnosis  POA   • **Pneumothorax, left [J93.9]  Yes   • Insomnia [G47.00]  Yes   •  "Pleuritic chest pain [R07.81]  Yes      Resolved Hospital Problems   No resolved problems to display.       32 y.o. female admitted with Pneumothorax, left.      1. Left-sided pneumothorax status post acute puncture , chest tube was initially discontinued/ removed yesterday and repeat chest x-ray today revealed worsening pneumothorax therefore underwent placement of chest tube again by CT surgery.  Continue with analgesics.  2. On Lovenox for DVT prophylaxis.    3. Code status is full code.      Julio William MD  [facility]  01/28/23  13:11 EST        Electronically signed by Julio William MD at 01/28/23 1313     Faiza Valdovinos, TONA, APRN at 01/28/23 1144     Attestation signed by Jj Villar MD at 01/28/23 1202    I have reviewed this documentation and agree.                      Chief Complaint: Left pneumothorax  S/P: Chest tube placement  POD # 0    Subjective:  Symptoms:  Worsening.  She reports shortness of breath and chest pain.    Diet:  Adequate intake.    Activity level: Impaired due to pain.    Pain:  She complains of pain that is moderate.  Pain is partially controlled.        Vital Signs:  Temp:  [98.1 °F (36.7 °C)-98.8 °F (37.1 °C)] 98.5 °F (36.9 °C)  Heart Rate:  [89-95] 94  Resp:  [16-18] 18  BP: (104-116)/(68-72) 104/70    Intake & Output (last day)       01/27 0701  01/28 0700 01/28 0701  01/29 0700    P.O. 740     Total Intake(mL/kg) 740 (13.6)     Net +740           Urine Unmeasured Occurrence 5 x           Objective:  General Appearance:  Uncomfortable, well-appearing and in no acute distress.    Vital signs: (most recent): Blood pressure 104/70, pulse 94, temperature 98.5 °F (36.9 °C), temperature source Oral, resp. rate 18, height 165.1 cm (65\"), weight 54.4 kg (120 lb), last menstrual period 12/28/2022, SpO2 97 %.  Vital signs are normal.    Output: Producing urine.    HEENT: Normal HEENT exam.    Lungs:  Normal effort.  She is not in respiratory " distress.  There are decreased breath sounds.    Heart: Normal rate.    Abdomen: Abdomen is soft.    Extremities: Normal range of motion.    Pulses: Distal pulses are intact.    Neurological: Patient is alert and oriented to person, place and time.    Pupils:  Pupils are equal, round, and reactive to light.    Skin:  Warm.              Chest tube:   Site: Left, Clean, Dry, Intact and Securement device intact  Suction: -20 cm  Air Leak: negative    Results Review:     I reviewed the patient's new clinical results.  I reviewed the patient's new imaging results and agree with the interpretation.  I reviewed the patient's other test results and agree with the interpretation  Discussed with patient, mother at bedside and Dr. Villar    Imaging Results (Last 24 Hours)     Procedure Component Value Units Date/Time    XR Chest 1 View [[id number]] Collected: 01/28/23 1043     Updated: 01/28/23 1047    Narrative:      XR CHEST 1 VW-     HISTORY: Female who is 32 years-old,  chest tube placement     TECHNIQUE: Frontal view of the chest     COMPARISON: 01/28/2023     FINDINGS: Left chest tube is seen at the left base. Heart, mediastinum  and pulmonary vasculature are unremarkable. The left lung has  reexpanded, and left pneumothorax is markedly decreased, now measuring  about 2 mm at the apex. No focal pulmonary consolidation, pleural  effusion, or right pneumothorax. No acute osseous process.       Impression:      Chest tube placement with marked decrease of previous left  pneumothorax.     This report was finalized on 1/28/2023 10:44 AM by Dr. Elia Becker M.D.       XR Chest 1 View [[id number]] Collected: 01/28/23 0618     Updated: 01/28/23 0626    Narrative:      XR CHEST 1 VW-     HISTORY: Female who is 32 years-old,  left pneumothorax     TECHNIQUE: Frontal view of the chest     COMPARISON: 01/27/2023     FINDINGS: Heart, mediastinum and pulmonary vasculature are unremarkable.  Previous left pneumothorax shows interval  increase, peripherally  measuring as much as 2.1 cm near the base, and 2.4 cm at the upper  hemithorax. At the apex, the pneumothorax measures about 3.5 cm.  Estimated 25-30% left pneumothorax. Mild atelectasis is seen in the left  lung. No pleural effusion, or right pneumothorax. No acute osseous  process.       Impression:      Further interval increase in left pneumothorax.     Discussed by telephone with patient's nurse, John, at time of  interpretation, 0617, 01/28/2023.     This report was finalized on 1/28/2023 6:23 AM by Dr. Elia Becker M.D.       XR Chest 1 View [[id number]] Collected: 01/27/23 1319     Updated: 01/27/23 1323    Narrative:      XR CHEST 1 VW-     Chemical: Chest tube removal     COMPARISON 1/27/2023 at 0933 hours, current examination 1218 hours     FINDINGS: The left-sided pneumothorax demonstrates interval enlargement.  The left-sided chest tube has been removed. Distance between the pleural  reflection and apex is currently 3.8 cm compared to 2 cm previously.     No mediastinal shift, the right lung remains clear. No pleural fusion.     CONCLUSION: Enlarging left pneumothorax status post chest tube removal.     This report was finalized on 1/27/2023 1:20 PM by Dr. Levon Cole M.D.             Lab Results:     Lab Results (last 24 hours)     ** No results found for the last 24 hours. **           Assessment & Plan       Pneumothorax, left    Insomnia    Pleuritic chest pain       Assessment & Plan    I have independently reviewed the chest x-ray performed this morning prior to chest tube placement which demonstrates interval increase in pleural sided pneumothorax.    Left pneumothorax: Patient had acupuncture on 1/26/2023 with increased shortness of breath and chest pain.  Chest x-ray in the ER showed pneumothorax and chest tube was placed.  This appeared resolved on imaging yesterday and chest tube was removed at the bedside.  Follow-up imaging demonstrated increased pleural  separation and a subsequent chest tube was placed at the bedside today by Dr. Villar.  Follow-up imaging is again stable with adequate aeration and expansion of the left lung.  We will leave her chest tube to -20 cm of suction and recheck a chest x-ray in the morning.  A CT of the chest without contrast has also been ordered for further evaluation.      Faiza Valdovinos DNP, APRN  [facility]  23  11:45 EST            Electronically signed by Jj Villar MD at 23 1202     Nuno Castellano MD at 23 [id number]              Name: Lena Sapp ADMIT: 2023   : 1990  PCP: Provider, No Known    MRN: [id number] LOS: 0 days   AGE/SEX: 32 y.o. female  ROOM: [facility]   Subjective   Chief Complaint   Patient presents with   • Back Pain     S/p chest tube placement yesterday  Removed today   Feels well  On RA    ROS  No f/c  No n/v  No cp/palp  No soa/cough    Objective   Vital Signs  Temp:  [98.1 °F (36.7 °C)-100 °F (37.8 °C)] 98.1 °F (36.7 °C)  Heart Rate:  [] 92  Resp:  [14-18] 18  BP: ()/(58-85) 105/72  SpO2:  [97 %-100 %] 100 %  on   ;   Device (Oxygen Therapy): room air  Body mass index is 19.97 kg/m².    Physical Exam  Constitutional:       General: She is not in acute distress.  HENT:      Head: Normocephalic and atraumatic.   Eyes:      General: No scleral icterus.  Cardiovascular:      Rate and Rhythm: Regular rhythm.      Heart sounds: Normal heart sounds.   Pulmonary:      Effort: Pulmonary effort is normal. No respiratory distress.   Abdominal:      General: There is no distension.      Palpations: Abdomen is soft.   Musculoskeletal:      Cervical back: Neck supple.   Neurological:      Mental Status: She is alert.   Psychiatric:         Behavior: Behavior normal.         Results Review:       I reviewed the patient's new clinical results.  Results from last 7 days   Lab Units 23   WBC 10*3/mm3 5.80   HEMOGLOBIN g/dL 13.2   PLATELETS 10*3/mm3  280     Results from last 7 days   Lab Units 01/26/23 1958   SODIUM mmol/L 140   POTASSIUM mmol/L 4.0   CHLORIDE mmol/L 106   CO2 mmol/L 24.7   BUN mg/dL 11   CREATININE mg/dL 0.77   GLUCOSE mg/dL 82   Estimated Creatinine Clearance: 89.2 mL/min (by C-G formula based on SCr of 0.77 mg/dL).  Results from last 7 days   Lab Units 01/26/23 1958   ALBUMIN g/dL 4.6   BILIRUBIN mg/dL 0.2   ALK PHOS U/L 57   AST (SGOT) U/L 15   ALT (SGPT) U/L 14     Results from last 7 days   Lab Units 01/26/23 1958   CALCIUM mg/dL 9.1   ALBUMIN g/dL 4.6         Coag     HbA1C No results found for: HGBA1C  Infection     Radiology(recent) XR Chest 1 View    Result Date: 1/26/2023  Interval placement of a left-sided pleural drainage catheter. Residual tiny left apical pneumothorax noted.  This report was finalized on 1/26/2023 9:23 PM by Dr. Jacinda Hamlin M.D.      No results found for: TROPONINT, TROPONINI, BNP  No components found for: TSH;2    enoxaparin, 40 mg, Subcutaneous, Q24H  sodium chloride, 10 mL, Intravenous, Q12H  traZODone, 50 mg, Oral, Nightly       Diet: Regular/House Diet; Texture: Regular Texture (IDDSI 7); Fluid Consistency: Thin (IDDSI 0)      Assessment & Plan     Active Hospital Problems    Diagnosis  POA   • **Pneumothorax, left [J93.9]  Yes   • Insomnia [G47.00]  Yes   • Pleuritic chest pain [R07.81]  Yes      Resolved Hospital Problems   No resolved problems to display.       · S/p CT yesterday, removed today  · Residual PTX on xray today  · PRN agents for pain  · Home trazodone  · D/W Thoracic Surgery service. With persistent PTX on imaging this afternoon will keep in hospital and repeat CXR in AM per their recomendations      DW RN  DW Faiza Valdovinos DNP, APRN  DW family    Nuno Castellano MD  [facility]  01/27/23  18:34 EST    Electronically signed by Nuno Castellano MD at 01/27/23 4617     Jj Villar MD at 01/27/23 1355        Progress note    The chest tube that was  placed in ER last night resulted in resolution of the pneumothorax.  However, the chest tube was noted to be shifted on the morning x-ray.  There was small apical pneumothorax.  The tube was not in communication with the pleural cavity and the water column was not tidaling as well.  Therefore the chest tube was removed.    Repeat chest x-ray revealed slight increase in the pneumothorax.  I recommend she stay in the hospital overnight for observation.    If she has increasing pneumothorax, I will plan for chest placement.    Jj Villar MD  Thoracic Surgeon    Electronically signed by Jj Villar MD at 01/27/23 1402          Consult Notes (last 72 hours)      Faiza Valdovinos DNP, APRN at 01/27/23 1031      Consult Orders    1. Inpatient Thoracic Surgery Consult [[id number]] ordered by Tamera Moffett APRN          Attestation signed by Jj Villar MD at 01/28/23 1202    I have reviewed this documentation and agree.                      Inpatient Thoracic Surgery Consult  Consult performed by: Faiza Valdovinos DNP, APRN  Consult ordered by: Tamera Moffett APRN          Patient Care Team:  Provider, No Known as PCP - General  Provider, No Known as PCP - Family Medicine    Chief Complaint   Patient presents with   • Back Pain       Subjective     History of Present Illness    Ms. Sapp is a pleasant 32-year-old lady without a significant past medical history.  She presented to [facility] ER yesterday with complaints of left-sided back pain and shortness of breath after acupuncture earlier that day.  Initial imaging demonstrated left-sided pneumothorax and a chest tube was placed in the ER.  Thoracic surgery was consulted for management.      Review of Systems   Constitutional: Negative.    HENT: Negative.    Eyes: Negative.    Respiratory: Positive for shortness of breath.    Cardiovascular: Positive for chest pain.   Endocrine: Negative.    Genitourinary: Negative.    Musculoskeletal:  Positive for back pain.   Skin: Negative.    Allergic/Immunologic: Negative.    Neurological: Negative.    Hematological: Negative.    Psychiatric/Behavioral: Negative.         Patient Active Problem List   Diagnosis   • Pneumothorax, left   • Insomnia   • Pleuritic chest pain     Past Medical History:   Diagnosis Date   • Kyphosis      Past Surgical History:   Procedure Laterality Date   • CYST REMOVAL      uterine cyst     History reviewed. No pertinent family history.  Social History     Socioeconomic History   • Marital status: Single   Tobacco Use   • Smoking status: Never   Vaping Use   • Vaping Use: Never used   Substance and Sexual Activity   • Alcohol use: Never   • Drug use: Never     Medications Prior to Admission   Medication Sig Dispense Refill Last Dose   • Qelbree 200 MG capsule sustained-release 24 hr Take 200 mg by mouth Daily.      • traZODone (DESYREL) 50 MG tablet Take 50 mg by mouth Every Night.        No Known Allergies    Objective      Vital Signs  Temp:  [98.1 °F (36.7 °C)-100 °F (37.8 °C)] 98.1 °F (36.7 °C)  Heart Rate:  [] 97  Resp:  [14-18] 16  BP: ()/(58-85) 90/59    Intake & Output (last day)       01/26 0701  01/27 0700 01/27 0701  01/28 0700          Urine Unmeasured Occurrence 1 x 1 x          Physical Exam  Constitutional:       General: She is not in acute distress.     Appearance: Normal appearance.   HENT:      Head: Normocephalic and atraumatic.   Cardiovascular:      Rate and Rhythm: Normal rate.      Pulses: Normal pulses.   Pulmonary:      Effort: Pulmonary effort is normal. No respiratory distress.   Chest:      Chest wall: Tenderness (left chest tube in place) present.   Musculoskeletal:         General: Normal range of motion.      Cervical back: Normal range of motion and neck supple.   Skin:     General: Skin is warm and dry.   Neurological:      General: No focal deficit present.      Mental Status: She is alert and oriented to person, place, and time.    Psychiatric:         Mood and Affect: Mood normal.         Behavior: Behavior normal.         Results Review:    I reviewed the patient's new clinical results.  I reviewed the patient's new imaging results and agree with the interpretation.  I reviewed the patient's other test results and agree with the interpretation  Discussed with patient, RN and Dr. Villar    Imaging Results (Last 24 Hours)     Procedure Component Value Units Date/Time    XR Chest 1 View [[id number]] Resulted: 01/27/23 1208     Updated: 01/27/23 1249    XR Chest 1 View [[id number]] Collected: 01/27/23 1013     Updated: 01/27/23 1019    Narrative:      XR CHEST 1 VW-     Clinical: Follow-up left-sided pneumothorax     COMPARISON examination 1/26/2023 at 2056 hours     FINDINGS: Small bore left-sided chest tube is again demonstrated, the  tip has shifted, now directed inferiorly. The left apical pneumothorax  is larger compared to the previous examination. Approximately 6-8% at  this time.     The cardiomediastinal silhouette is stable, the right lung is clear. No  acute airspace disease has developed. The remainder is unremarkable.     This report was finalized on 1/27/2023 10:16 AM by Dr. Levon Cole M.D.       XR Chest 1 View [[id number]] Collected: 01/26/23 2121     Updated: 01/26/23 2126    Narrative:      SINGLE VIEW OF THE CHEST     HISTORY: Chest tube placement     COMPARISON: 01/26/2023     FINDINGS:  Left-sided pleural drainage catheter has been placed, with subsequent  reexpansion of the left lung. There is a residual tiny left apical  pneumothorax. Heart size is within normal limits. Right lung appears  clear.       Impression:      Interval placement of a left-sided pleural drainage catheter. Residual  tiny left apical pneumothorax noted.     This report was finalized on 1/26/2023 9:23 PM by Dr. Jacinda Hamlin M.D.       XR Chest 2 View [[id number]] Collected: 01/26/23 1922     Updated: 01/26/23 1929    Narrative:      XR CHEST 2  VW-     Clinical: Chest and back pain 32-year-old female     FINDINGS: There is a left-sided pneumothorax, estimated size 20%. There  is an apical and basilar component. No significant mediastinal shift.  Heart size within normal limits. No mediastinal or hilar mass. The right  lung is clear.     No pleural effusion nor vascular congestion seen.     CONCLUSION: Left-sided pneumothorax, estimated size 20%.     FINDINGS called Dr. Garza at the time of completion 7:30 PM.     This report was finalized on 1/26/2023 7:26 PM by Dr. Levon Cole M.D.             Lab Results:  Lab Results (last 24 hours)     Procedure Component Value Units Date/Time    Comprehensive Metabolic Panel [[id number]] Collected: 01/26/23 1958    Specimen: Blood Updated: 01/26/23 2057     Glucose 82 mg/dL      BUN 11 mg/dL      Creatinine 0.77 mg/dL      Sodium 140 mmol/L      Potassium 4.0 mmol/L      Comment: Slight hemolysis detected by analyzer. Results may be affected.        Chloride 106 mmol/L      CO2 24.7 mmol/L      Calcium 9.1 mg/dL      Total Protein 6.9 g/dL      Albumin 4.6 g/dL      ALT (SGPT) 14 U/L      AST (SGOT) 15 U/L      Comment: Slight hemolysis detected by analyzer. Results may be affected.        Alkaline Phosphatase 57 U/L      Total Bilirubin 0.2 mg/dL      Globulin 2.3 gm/dL      A/G Ratio 2.0 g/dL      BUN/Creatinine Ratio 14.3     Anion Gap 9.3 mmol/L      eGFR 104.6 mL/min/1.73     Narrative:      GFR Normal >60  Chronic Kidney Disease <60  Kidney Failure <15      CBC & Differential [[id number]]  (Abnormal) Collected: 01/26/23 1958    Specimen: Blood Updated: 01/26/23 2022    Narrative:      The following orders were created for panel order CBC & Differential.  Procedure                               Abnormality         Status                     ---------                               -----------         ------                     CBC Auto Differential[[id number]]        Abnormal            Final result                  Please view results for these tests on the individual orders.    CBC Auto Differential [[id number]]  (Abnormal) Collected: 01/26/23 1958    Specimen: Blood Updated: 01/26/23 2022     WBC 5.80 10*3/mm3      RBC 4.16 10*6/mm3      Hemoglobin 13.2 g/dL      Hematocrit 38.1 %      MCV 91.6 fL      MCH 31.7 pg      MCHC 34.6 g/dL      RDW 11.5 %      RDW-SD 39.0 fl      MPV 10.4 fL      Platelets 280 10*3/mm3      Neutrophil % 61.2 %      Lymphocyte % 26.9 %      Monocyte % 10.2 %      Eosinophil % 0.9 %      Basophil % 0.5 %      Immature Grans % 0.3 %      Neutrophils, Absolute 3.55 10*3/mm3      Lymphocytes, Absolute 1.56 10*3/mm3      Monocytes, Absolute 0.59 10*3/mm3      Eosinophils, Absolute 0.05 10*3/mm3      Basophils, Absolute 0.03 10*3/mm3      Immature Grans, Absolute 0.02 10*3/mm3      nRBC 0.0 /100 WBC               Assessment & Plan       Pneumothorax, left    Insomnia    Pleuritic chest pain      Assessment & Plan    I have independently reviewed the patient's radiographic imaging including chest x-ray performed pre and post chest tube placement.  Left-sided pneumothorax appears to have resolved once chest tube was placed.    Left pneumothorax: Patient received acupuncture earlier today was found to have pneumothorax on admission.  This appears resolved with chest tube placement.  No airleak with forceful cough.  Chest tube was removed at the bedside today without difficulty.  Dry DuoDERM applied to former chest tube site.  Patient may shower this evening and remove DuoDERM after 48 hours.  We will check a follow-up image at 12 noon.  Patient should be stable for discharge as long as pneumothorax has not recurred on imaging.  She may follow-up with us as needed in our clinic.  Thank you for letting us precipitate in the care of Ms. Sapp      I discussed the patients findings and our recommendations with patient, nursing staff and Dr. Villar as well as family at bedside    Thank you for this consult  and allowing us to participate in the care of your patient.       Faiza Valdovinos DNP, APRN  [facility]  01/27/23  12:49 EST      I spent >45 minutes reviewing the patient's chart including medical history, notes, radiographic imaging, labs and performing an assessment and development of a plan and discussion with the patient/family at bedside.      ADDENDUM: Follow-up x-ray reviewed status post chest tube removal.  Persistent pleural separation.  Recommend to observe 1 more night and recheck a chest x-ray in the morning.      Electronically signed by Jj Villar MD at 01/28/23 1204

## 2023-01-31 ENCOUNTER — APPOINTMENT (OUTPATIENT)
Dept: GENERAL RADIOLOGY | Facility: HOSPITAL | Age: 33
DRG: 201 | End: 2023-01-31
Payer: COMMERCIAL

## 2023-01-31 VITALS
WEIGHT: 120 LBS | BODY MASS INDEX: 19.99 KG/M2 | TEMPERATURE: 99 F | OXYGEN SATURATION: 92 % | DIASTOLIC BLOOD PRESSURE: 80 MMHG | SYSTOLIC BLOOD PRESSURE: 113 MMHG | HEIGHT: 65 IN | HEART RATE: 100 BPM | RESPIRATION RATE: 16 BRPM

## 2023-01-31 PROCEDURE — 71045 X-RAY EXAM CHEST 1 VIEW: CPT

## 2023-01-31 PROCEDURE — 99232 SBSQ HOSP IP/OBS MODERATE 35: CPT | Performed by: NURSE PRACTITIONER

## 2023-01-31 RX ADMIN — GABAPENTIN 300 MG: 300 CAPSULE ORAL at 08:20

## 2023-01-31 RX ADMIN — ACETAMINOPHEN 1000 MG: 500 TABLET, FILM COATED ORAL at 08:20

## 2023-01-31 RX ADMIN — DOCUSATE SODIUM 50MG AND SENNOSIDES 8.6MG 1 TABLET: 8.6; 5 TABLET, FILM COATED ORAL at 08:20

## 2023-01-31 RX ADMIN — Medication 10 ML: at 08:21

## 2023-01-31 RX ADMIN — POLYETHYLENE GLYCOL 3350 17 G: 17 POWDER, FOR SOLUTION ORAL at 08:20

## 2023-01-31 NOTE — DISCHARGE INSTRUCTIONS
Post Pneumothorax with Chest Tube Discharge Instructions    1. Activity:  Climb stairs as tolerated  May drive car when no longer taking pain medication.  Walk at least 3-4 times a day  Limit lifting for first week. No lifting, pushing, or pulling greater than 10 pounds till seen by MD.  Continue to use your incentive spirometer at least 4 times per day.    2. Nutrition:  Resume previous diet  Eat well balanced meals  Avoid constipation by eating fresh fruits, vegetables, whole grain foods and increasing fluid intake.    3. Incision (wound) Care:  Remove dressing after 48 hours  If continued drainage, change dressing every 24 hours and as needed.  May shower after discharge.  Wash around incision area with soap and water daily.  No lotions or creams on incision area.  Take temperature daily for the first week after discharge.     4. When to call for Medical Advise:  Fever greater than 101 degrees  Unusual or severe pain  Difficulty breathing  Incision changes (redness, swelling, or increased drainage)  Any questions or problems    5. Medication Instructions:  Take Pain medications as directed to stay comfortable  No driving or drinking alcohol when taking prescribed pain meds  Laxative of choice if needed for constipation.    6. Medication and dosages:  See discharge medication instruction form

## 2023-01-31 NOTE — PLAN OF CARE
Problem: Adult Inpatient Plan of Care  Goal: Plan of Care Review  Outcome: Ongoing, Progressing  Flowsheets (Taken 1/31/2023 5590)  Progress: improving  Plan of Care Reviewed With: patient  Outcome Evaluation: pt w chest tube clamped, no sob noted, medicated for co pain x1 this shift, resting most of shift w eyes closed.  will cont to monitor   Goal Outcome Evaluation:  Plan of Care Reviewed With: patient        Progress: improving  Outcome Evaluation: pt w chest tube clamped, no sob noted, medicated for co pain x1 this shift, resting most of shift w eyes closed.  will cont to monitor

## 2023-01-31 NOTE — DISCHARGE SUMMARY
Patient Name: Lena Sapp  : 1990  MRN: [id number]    Date of Admission: 2023  Date of Discharge:  2023  Primary Care Physician: Provider, No Known      Chief Complaint:   Back Pain      Discharge Diagnoses     Active Hospital Problems    Diagnosis  POA   • **Pneumothorax, left [J93.9]  Yes   • Drug-induced constipation [K59.03]  Unknown   • Insomnia [G47.00]  Yes   • Pleuritic chest pain [R07.81]  Yes      Resolved Hospital Problems   No resolved problems to display.        Hospital Course     Ms. Sapp is a 32 y.o. female with a history of insomnia presenting with shortness of breath and chest pain found to have pneumothorax secondary to acupuncture treatment on .  Chest tube was placed, appeared resolved on  and chest tube was removed at bedside.  Follow-up imaging demonstrated increased pleural separation and subsequent chest tube was placed on .  Follow-up imaging stable after chest tube clamped for 24 hours.  Thoracic surgery removed chest tube this morning and are okay with discharging the patient.  We will follow-up with thoracic surgery in 2 to 4 weeks.    At the time of discharge patient was told to take all medications as prescribed, keep all follow-up appointments, and call their doctor or return to the hospital with any worsening or concerning symptoms.    Day of Discharge     Subjective:  Patient lying comfortably in bed.  Patient's had chest tube removed.  Patient without any dyspnea or chest pain.  States that she has been sleeping a lot.    Review of Systems   Constitutional: Negative for chills and fever.   Respiratory: Negative for cough and shortness of breath.    Cardiovascular: Negative for chest pain and leg swelling.   Gastrointestinal: Negative for abdominal pain, diarrhea and nausea.       Physical Exam:  Temp:  [97.6 °F (36.4 °C)-99 °F (37.2 °C)] 99 °F (37.2 °C)  Heart Rate:  [] 100  Resp:  [16] 16  BP: ()/(68-80) 113/80  Body mass  index is 19.97 kg/m².  Physical Exam  Constitutional:       General: She is not in acute distress.     Appearance: She is not diaphoretic.   HENT:      Head: Normocephalic and atraumatic.   Cardiovascular:      Rate and Rhythm: Normal rate and regular rhythm.   Pulmonary:      Effort: Pulmonary effort is normal. No respiratory distress.   Abdominal:      General: Abdomen is flat.      Palpations: Abdomen is soft.   Musculoskeletal:         General: No swelling or deformity. Normal range of motion.   Skin:     General: Skin is warm and dry.   Neurological:      General: No focal deficit present.      Mental Status: She is alert. Mental status is at baseline.         Consultants     Consult Orders (all) (From admission, onward)     Start     Ordered    01/26/23 2058  [facility] (on-call MD unless specified) Details  Once        Specialty:  Hospitalist  Provider:  (Not yet assigned)    01/26/23 2057 01/26/23 1946  Inpatient Thoracic Surgery Consult  Once        Specialty:  Thoracic Surgery  Provider:  (Not yet assigned)    01/26/23 1945              Procedures     Imaging Results (All)     Procedure Component Value Units Date/Time    XR Chest 1 View [[id number]] Collected: 01/31/23 0752     Updated: 01/31/23 0756    Narrative:      XR CHEST 1 VW-  01/31/2023     HISTORY: Evaluate pneumothorax.     There is a tiny left apical pneumothorax with approximately 5 mm pleural  separation in the left apex. Heart size is within normal limits. Lungs  appear clear. Pigtail catheter seen terminating over the lateral aspect  of the left mid hemithorax.       Impression:      1. Tiny left apical pneumothorax.     This report was finalized on 1/31/2023 7:53 AM by Dr. Almas Berg M.D.       XR Chest 1 View [[id number]] Collected: 01/30/23 0826     Updated: 01/30/23 0831    Narrative:      XR CHEST 1 VW-     HISTORY: Female who is 32 years-old,  left pneumothorax     TECHNIQUE: Frontal view of the chest     COMPARISON: 01/29/2023      FINDINGS: Left chest tube is more peripherally positioned. Heart,  mediastinum and pulmonary vasculature are unremarkable. Previous left  pneumothorax appears increased, now measuring 8mm thickness. No focal  pulmonary consolidation, pleural effusion, or right pneumothorax. No  acute osseous process.       Impression:      Small left pneumothorax has increased, 8 mm thickness.     This report was finalized on 1/30/2023 8:28 AM by Dr. Elia Becker M.D.       XR Chest 1 View [[id number]] Collected: 01/29/23 0555     Updated: 01/29/23 0601    Narrative:      XR CHEST 1 VW-     HISTORY: Female who is 32 years-old,  left pneumothorax, follow-up     TECHNIQUE: Echogenicity of the chest     COMPARISON: 01/28/2023     FINDINGS: Left chest tube appears stable. Heart, mediastinum and  pulmonary vasculature are unremarkable. Previous left pneumothorax  appears mostly resolved, with trace residual left apical pneumothorax.  No focal pulmonary consolidation, pleural effusion, or right  pneumothorax. No acute osseous process.       Impression:      Previous left pneumothorax appears mostly resolved, with trace residual  left apical pneumothorax.      This report was finalized on 1/29/2023 5:58 AM by Dr. Elia Becker M.D.       CT Chest Without Contrast Diagnostic [[id number]] Collected: 01/28/23 2213     Updated: 01/28/23 2237    Narrative:      CT chest without contrast     HISTORY: 32-year-old female with left-sided pneumothorax. Assess for  pulmonary blebs.     TECHNIQUE: CT chest with axial imaging from the thoracic inlet to the  upper abdomen without IV contrast     COMPARISON: [facility] chest x-ray 1/20/2023. No previous chest CT for  comparison.     FINDINGS: There is a left chest drain with pigtail loop anterior to the  lingula. There is a small pneumothorax is demonstrated in the  subpulmonic region courses anterior to the lingula, left upper lobe, and  extends in the left apex. No pulmonary parenchymal  abnormality or bleb  is demonstrated. There is no evidence for lung mass or suspicious  nodule. No pleural effusion is evident.     There is no evidence for mediastinal or hilar or axillary lizet  enlargement evaluation limited without IV contrast. No endotracheal or  central intrabronchial lesion is demonstrated. Imaging through the upper  abdomen appears within normal limits.       Impression:      Small left pneumothorax with left chest tube in place. No pulmonary  parenchymal abnormality or evidence for bleb or lung mass.     Radiation dose reduction techniques were utilized, including automated  exposure control and exposure modulation based on body size.     This report was finalized on 1/28/2023 10:34 PM by Dr. Perico Bai M.D.       XR Chest 1 View [[id number]] Collected: 01/28/23 1043     Updated: 01/28/23 1047    Narrative:      XR CHEST 1 VW-     HISTORY: Female who is 32 years-old,  chest tube placement     TECHNIQUE: Frontal view of the chest     COMPARISON: 01/28/2023     FINDINGS: Left chest tube is seen at the left base. Heart, mediastinum  and pulmonary vasculature are unremarkable. The left lung has  reexpanded, and left pneumothorax is markedly decreased, now measuring  about 2 mm at the apex. No focal pulmonary consolidation, pleural  effusion, or right pneumothorax. No acute osseous process.       Impression:      Chest tube placement with marked decrease of previous left  pneumothorax.     This report was finalized on 1/28/2023 10:44 AM by Dr. Elia Becker M.D.       XR Chest 1 View [[id number]] Collected: 01/28/23 0618     Updated: 01/28/23 0626    Narrative:      XR CHEST 1 VW-     HISTORY: Female who is 32 years-old,  left pneumothorax     TECHNIQUE: Frontal view of the chest     COMPARISON: 01/27/2023     FINDINGS: Heart, mediastinum and pulmonary vasculature are unremarkable.  Previous left pneumothorax shows interval increase, peripherally  measuring as much as 2.1 cm near the  base, and 2.4 cm at the upper  hemithorax. At the apex, the pneumothorax measures about 3.5 cm.  Estimated 25-30% left pneumothorax. Mild atelectasis is seen in the left  lung. No pleural effusion, or right pneumothorax. No acute osseous  process.       Impression:      Further interval increase in left pneumothorax.     Discussed by telephone with patient's nurse, John, at time of  interpretation, 0617, 01/28/2023.     This report was finalized on 1/28/2023 6:23 AM by Dr. Elia Becker M.D.       XR Chest 1 View [[id number]] Collected: 01/27/23 1319     Updated: 01/27/23 1323    Narrative:      XR CHEST 1 VW-     Chemical: Chest tube removal     COMPARISON 1/27/2023 at 0933 hours, current examination 1218 hours     FINDINGS: The left-sided pneumothorax demonstrates interval enlargement.  The left-sided chest tube has been removed. Distance between the pleural  reflection and apex is currently 3.8 cm compared to 2 cm previously.     No mediastinal shift, the right lung remains clear. No pleural fusion.     CONCLUSION: Enlarging left pneumothorax status post chest tube removal.     This report was finalized on 1/27/2023 1:20 PM by Dr. Levon Cole M.D.       XR Chest 1 View [[id number]] Collected: 01/27/23 1013     Updated: 01/27/23 1019    Narrative:      XR CHEST 1 VW-     Clinical: Follow-up left-sided pneumothorax     COMPARISON examination 1/26/2023 at 2056 hours     FINDINGS: Small bore left-sided chest tube is again demonstrated, the  tip has shifted, now directed inferiorly. The left apical pneumothorax  is larger compared to the previous examination. Approximately 6-8% at  this time.     The cardiomediastinal silhouette is stable, the right lung is clear. No  acute airspace disease has developed. The remainder is unremarkable.     This report was finalized on 1/27/2023 10:16 AM by Dr. Levon Cole M.D.       XR Chest 1 View [[id number]] Collected: 01/26/23 2121     Updated: 01/26/23 2126     Narrative:      SINGLE VIEW OF THE CHEST     HISTORY: Chest tube placement     COMPARISON: 01/26/2023     FINDINGS:  Left-sided pleural drainage catheter has been placed, with subsequent  reexpansion of the left lung. There is a residual tiny left apical  pneumothorax. Heart size is within normal limits. Right lung appears  clear.       Impression:      Interval placement of a left-sided pleural drainage catheter. Residual  tiny left apical pneumothorax noted.     This report was finalized on 1/26/2023 9:23 PM by Dr. Jacinda Hamlin M.D.       XR Chest 2 View [[id number]] Collected: 01/26/23 1922     Updated: 01/26/23 1929    Narrative:      XR CHEST 2 VW-     Clinical: Chest and back pain 32-year-old female     FINDINGS: There is a left-sided pneumothorax, estimated size 20%. There  is an apical and basilar component. No significant mediastinal shift.  Heart size within normal limits. No mediastinal or hilar mass. The right  lung is clear.     No pleural effusion nor vascular congestion seen.     CONCLUSION: Left-sided pneumothorax, estimated size 20%.     FINDINGS called Dr. Garza at the time of completion 7:30 PM.     This report was finalized on 1/26/2023 7:26 PM by Dr. Levon Cole M.D.             Pertinent Labs     Results from last 7 days   Lab Units 01/29/23  0400 01/26/23 1958   WBC 10*3/mm3 4.85 5.80   HEMOGLOBIN g/dL 13.1 13.2   PLATELETS 10*3/mm3 283 280     Results from last 7 days   Lab Units 01/29/23  0400 01/26/23 1958   SODIUM mmol/L 140 140   POTASSIUM mmol/L 4.1 4.0   CHLORIDE mmol/L 107 106   CO2 mmol/L 26.0 24.7   BUN mg/dL 7 11   CREATININE mg/dL 0.72 0.77   GLUCOSE mg/dL 97 82   Estimated Creatinine Clearance: 95.4 mL/min (by C-G formula based on SCr of 0.72 mg/dL).  Results from last 7 days   Lab Units 01/26/23 1958   ALBUMIN g/dL 4.6   BILIRUBIN mg/dL 0.2   ALK PHOS U/L 57   AST (SGOT) U/L 15   ALT (SGPT) U/L 14     Results from last 7 days   Lab Units 01/29/23 0400 01/26/23 1958    CALCIUM mg/dL 8.8 9.1   ALBUMIN g/dL  --  4.6               Invalid input(s): LDLCALC        Test Results Pending at Discharge       Discharge Details        Discharge Medications      New Medications      Instructions Start Date   acetaminophen 325 MG tablet  Commonly known as: TYLENOL   650 mg, Oral, Every 4 Hours PRN         Continue These Medications      Instructions Start Date   Qelbree 200 MG capsule sustained-release 24 hr  Generic drug: Viloxazine HCl ER   200 mg, Oral, Daily      traZODone 50 MG tablet  Commonly known as: DESYREL   50 mg, Oral, Nightly             Allergies   Allergen Reactions   • Dilaudid [Hydromorphone Hcl] Myalgia     Patient had tightening in her neck          Discharge Disposition:  Home or Self Care    Discharge Diet:  Diet Order   Procedures   • Diet: Regular/House Diet; Texture: Regular Texture (IDDSI 7); Fluid Consistency: Thin (IDDSI 0)       Discharge Activity:   As tolerated    CODE STATUS:    Code Status and Medical Interventions:   Ordered at: 01/27/23 1833     Code Status (Patient has no pulse and is not breathing):    CPR (Attempt to Resuscitate)     Medical Interventions (Patient has pulse or is breathing):    Full Support       Future Appointments   Date Time Provider Department Center   2/27/2023  2:00 PM Nelson Wheat APRN [facility]     Additional Instructions for the Follow-ups that You Need to Schedule     Discharge Follow-up with Specialty: PCP in 1-2 weeks, Thoracic Surgery in 1 week or as directed   As directed      Specialty: PCP in 1-2 weeks, Thoracic Surgery in 1 week or as directed         XR Chest 2 View   Feb 28, 2023      Exam reason: f/u pneumothorax, left    Pregnant?: Unknown    Release to patient: Routine Release         XR Chest 2 View    Feb 28, 2023 (Approximate)      Exam reason: ptx    Pregnant?: No            Follow-up Information     Provider, No Known .    Contact information:  [facility]  [address]  [phone]             [facility] THORACIC SURGERY Follow up in 1 month(s).    Specialty: Thoracic Surgery  Contact information:  [facility]  [address] [address] [phone]  Additional information:  Phone Number: [phone]   Located in the [facility] building           Nelson Wheat APRN Follow up.    Specialties: Thoracic Surgery, Nurse Practitioner  Contact information:  [facility]  [place] [address]  [phone]                         Additional Instructions for the Follow-ups that You Need to Schedule     Discharge Follow-up with Specialty: PCP in 1-2 weeks, Thoracic Surgery in 1 week or as directed   As directed      Specialty: PCP in 1-2 weeks, Thoracic Surgery in 1 week or as directed         XR Chest 2 View   Feb 28, 2023      Exam reason: f/u pneumothorax, left    Pregnant?: Unknown    Release to patient: Routine Release         XR Chest 2 View    Feb 28, 2023 (Approximate)      Exam reason: ptx    Pregnant?: No           Time Spent on Discharge:  Greater than 30 minutes      Ronald Soto MD  [facility]  01/31/23  13:17 EST

## 2023-01-31 NOTE — PROGRESS NOTES
"    Chief Complaint: Left pneumothorax  S/P: Chest tube placement  POD # 3    Subjective:  Symptoms:  Worsening.  She reports shortness of breath and chest pain.  No weakness.    Diet:  Adequate intake.    Activity level: Impaired due to pain.    Pain:  She complains of pain that is moderate.  Pain is partially controlled.        Vital Signs:  Temp:  [97.6 °F (36.4 °C)-99 °F (37.2 °C)] 99 °F (37.2 °C)  Heart Rate:  [] 100  Resp:  [16] 16  BP: ()/(68-80) 113/80    Intake & Output (last day)       01/30 0701  01/31 0700 01/31 0701  02/01 0700    P.O. 480     Total Intake(mL/kg) 480 (8.8)     Urine (mL/kg/hr) 0 (0)     Stool 0     Chest Tube 0     Total Output 0     Net +480           Urine Unmeasured Occurrence 2 x     Stool Unmeasured Occurrence 1 x           Objective:  General Appearance:  Uncomfortable, well-appearing and in no acute distress.    Vital signs: (most recent): Blood pressure 113/80, pulse 100, temperature 99 °F (37.2 °C), temperature source Oral, resp. rate 16, height 165.1 cm (65\"), weight 54.4 kg (120 lb), last menstrual period 12/28/2022, SpO2 92 %.  Vital signs are normal.    Output: Producing urine.    HEENT: Normal HEENT exam.    Lungs:  Normal effort.  She is not in respiratory distress.  There are decreased breath sounds.    Heart: Normal rate.    Abdomen: Abdomen is soft.    Extremities: Normal range of motion.    Pulses: Distal pulses are intact.    Neurological: Patient is alert and oriented to person, place and time.    Pupils:  Pupils are equal, round, and reactive to light.    Skin:  Warm.              Chest tube:   Site: Left, Clean, Dry, Intact and Securement device intact  Suction: clamped  Air Leak: negative    Results Review:     I reviewed the patient's new clinical results.  I reviewed the patient's new imaging results and agree with the interpretation.  I reviewed the patient's other test results and agree with the interpretation    Imaging Results (Last 24 Hours)  "    Procedure Component Value Units Date/Time    XR Chest 1 View [[id number]] Collected: 01/31/23 0752     Updated: 01/31/23 0756    Narrative:      XR CHEST 1 VW-  01/31/2023     HISTORY: Evaluate pneumothorax.     There is a tiny left apical pneumothorax with approximately 5 mm pleural  separation in the left apex. Heart size is within normal limits. Lungs  appear clear. Pigtail catheter seen terminating over the lateral aspect  of the left mid hemithorax.       Impression:      1. Tiny left apical pneumothorax.     This report was finalized on 1/31/2023 7:53 AM by Dr. Almas Berg M.D.             Lab Results:     Lab Results (last 24 hours)     ** No results found for the last 24 hours. **           Assessment & Plan       Pneumothorax, left    Insomnia    Pleuritic chest pain    Drug-induced constipation       Assessment & Plan    Left pneumothorax: Patient had acupuncture on 1/26/2023 with increased shortness of breath and chest pain.  Chest x-ray in the ER showed pneumothorax and chest tube was placed.  This appeared resolved on imaging and chest tube was removed at the bedside on 1/27/2023.  Follow-up imaging demonstrated increased pleural separation and a subsequent chest tube was placed at the bedside on 1/28/2023.  Follow-up imaging was again stable with adequate aeration and expansion of the left lung.  CT chest did not reveal pulmonary bleb disease.  Pneumothorax does not appear to have recurred with chest tube clamped overnight.  Chest tube was removed at the bedside without difficulty.  She is stable for discharge from a thoracic standpoint.  We will arrange follow-up in our office with a chest x-ray in the next 2 to 4 weeks.      Faiza Valdovinos DNP, APRN  [facility]  01/31/23  12:03 EST

## 2023-02-01 ENCOUNTER — READMISSION MANAGEMENT (OUTPATIENT)
Dept: CALL CENTER | Facility: HOSPITAL | Age: 33
End: 2023-02-01
Payer: COMMERCIAL

## 2023-02-01 NOTE — CASE MANAGEMENT/SOCIAL WORK
Case Management Discharge Note      Final Note: Pt discharged home, no known needs.  PETE winters RN         Selected Continued Care - Discharged on 1/31/2023 Admission date: 1/26/2023 - Discharge disposition: Home or Self Care    Destination    No services have been selected for the patient.              Durable Medical Equipment    No services have been selected for the patient.              Dialysis/Infusion    No services have been selected for the patient.              Home Medical Care    No services have been selected for the patient.              Therapy    No services have been selected for the patient.              Community Resources    No services have been selected for the patient.              Community & DME    No services have been selected for the patient.                  Transportation Services  Private: Car    Final Discharge Disposition Code: 01 - home or self-care

## 2023-02-01 NOTE — OUTREACH NOTE
Prep Survey    Flowsheet Row Responses   [facility] facility patient discharged from? [place]   Is LACE score < 7 ? No   Eligibility Readm Mgmt   Discharge diagnosis **Pneumothorax   Does the patient have one of the following disease processes/diagnoses(primary or secondary)? Other   Does the patient have Home health ordered? No   Is there a DME ordered? No   Prep survey completed? Yes          GURPREET MCKEE - Registered Nurse

## 2023-02-02 NOTE — PAYOR COMM NOTE
"Lena Batista (32 y.o. Female)     PLEASE SEE ATTACHED FOR DC NOTICE    REF #   [id number]    THANK YOU  TERE CARROLL RN/ DEPT  [facility]   [phone]  [phone]    Date of Birth   1990    Social Security Number       [place]   [address] [place] [address]    Home Phone   [phone]    MRN   [id number]       Rastafarian   None    Marital Status   Single                            Admission Date   23    Admission Type   Emergency    Admitting Provider   Napoleon Conteh MD    Attending Provider   Aziza Portillo MD    Department, Room/Bed   [facility] 5 [facility], E560/1       Discharge Date   2023    Discharge Disposition   Home or Self Care    Discharge Destination                               Attending Provider: Aziza Portillo MD    Allergies: Dilaudid [Hydromorphone Hcl]    Isolation: None   Infection: None   Code Status: Prior    Ht: 165.1 cm (65\")   Wt: 54.4 kg (120 lb)    Admission Cmt: None   Principal Problem: Pneumothorax, left [J93.9]                 Active Insurance as of 2023     Primary Coverage     Payor Plan Insurance Group Employer/Plan Group    AET [employer] [place] Agency [employer] [facility] [place]      Payor Plan Address Payor Plan Phone Number Payor Plan Fax Number Effective Dates    [address]   2023 - None Entered    [facility] [phone]       Subscriber Name Subscriber Birth Date Member ID       LENA BATISTA 1990 [id number]                 Emergency Contacts      (Rel.) Home Phone Work Phone Mobile Phone    PATTY BATISTA (Mother) -- -- [phone]            [place]: [facility] [place] [id number]  Tax ID [id number]     Discharge Summary      Ronald Soto MD at 23 1316              Patient Name: Lena Batista  : 1990  MRN: [id number]    Date of Admission: 2023  Date of Discharge:  2023  Primary Care Physician: Provider, No Known      Chief Complaint:   Back " Pain      Discharge Diagnoses     Active Hospital Problems    Diagnosis  POA   • **Pneumothorax, left [J93.9]  Yes   • Drug-induced constipation [K59.03]  Unknown   • Insomnia [G47.00]  Yes   • Pleuritic chest pain [R07.81]  Yes      Resolved Hospital Problems   No resolved problems to display.        Hospital Course     Ms. Sapp is a 32 y.o. female with a history of insomnia presenting with shortness of breath and chest pain found to have pneumothorax secondary to acupuncture treatment on 1/26.  Chest tube was placed, appeared resolved on 1/27 and chest tube was removed at bedside.  Follow-up imaging demonstrated increased pleural separation and subsequent chest tube was placed on 1/28.  Follow-up imaging stable after chest tube clamped for 24 hours.  Thoracic surgery removed chest tube this morning and are okay with discharging the patient.  We will follow-up with thoracic surgery in 2 to 4 weeks.    At the time of discharge patient was told to take all medications as prescribed, keep all follow-up appointments, and call their doctor or return to the hospital with any worsening or concerning symptoms.    Day of Discharge     Subjective:  Patient lying comfortably in bed.  Patient's had chest tube removed.  Patient without any dyspnea or chest pain.  States that she has been sleeping a lot.    Review of Systems   Constitutional: Negative for chills and fever.   Respiratory: Negative for cough and shortness of breath.    Cardiovascular: Negative for chest pain and leg swelling.   Gastrointestinal: Negative for abdominal pain, diarrhea and nausea.       Physical Exam:  Temp:  [97.6 °F (36.4 °C)-99 °F (37.2 °C)] 99 °F (37.2 °C)  Heart Rate:  [] 100  Resp:  [16] 16  BP: ()/(68-80) 113/80  Body mass index is 19.97 kg/m².  Physical Exam  Constitutional:       General: She is not in acute distress.     Appearance: She is not diaphoretic.   HENT:      Head: Normocephalic and atraumatic.   Cardiovascular:       Rate and Rhythm: Normal rate and regular rhythm.   Pulmonary:      Effort: Pulmonary effort is normal. No respiratory distress.   Abdominal:      General: Abdomen is flat.      Palpations: Abdomen is soft.   Musculoskeletal:         General: No swelling or deformity. Normal range of motion.   Skin:     General: Skin is warm and dry.   Neurological:      General: No focal deficit present.      Mental Status: She is alert. Mental status is at baseline.         Consultants     Consult Orders (all) (From admission, onward)     Start     Ordered    01/26/23 2058  [facility] (on-call MD unless specified) Details  Once        Specialty:  Hospitalist  Provider:  (Not yet assigned)    01/26/23 2057 01/26/23 1946  Inpatient Thoracic Surgery Consult  Once        Specialty:  Thoracic Surgery  Provider:  (Not yet assigned)    01/26/23 1945              Procedures     Imaging Results (All)     Procedure Component Value Units Date/Time    XR Chest 1 View [[id number]] Collected: 01/31/23 0752     Updated: 01/31/23 0756    Narrative:      XR CHEST 1 VW-  01/31/2023     HISTORY: Evaluate pneumothorax.     There is a tiny left apical pneumothorax with approximately 5 mm pleural  separation in the left apex. Heart size is within normal limits. Lungs  appear clear. Pigtail catheter seen terminating over the lateral aspect  of the left mid hemithorax.       Impression:      1. Tiny left apical pneumothorax.     This report was finalized on 1/31/2023 7:53 AM by Dr. Almas Berg M.D.       XR Chest 1 View [[id number]] Collected: 01/30/23 0826     Updated: 01/30/23 0831    Narrative:      XR CHEST 1 VW-     HISTORY: Female who is 32 years-old,  left pneumothorax     TECHNIQUE: Frontal view of the chest     COMPARISON: 01/29/2023     FINDINGS: Left chest tube is more peripherally positioned. Heart,  mediastinum and pulmonary vasculature are unremarkable. Previous left  pneumothorax appears increased, now measuring 8mm thickness. No  focal  pulmonary consolidation, pleural effusion, or right pneumothorax. No  acute osseous process.       Impression:      Small left pneumothorax has increased, 8 mm thickness.     This report was finalized on 1/30/2023 8:28 AM by Dr. Elia Becker M.D.       XR Chest 1 View [[id number]] Collected: 01/29/23 0555     Updated: 01/29/23 0601    Narrative:      XR CHEST 1 VW-     HISTORY: Female who is 32 years-old,  left pneumothorax, follow-up     TECHNIQUE: Echogenicity of the chest     COMPARISON: 01/28/2023     FINDINGS: Left chest tube appears stable. Heart, mediastinum and  pulmonary vasculature are unremarkable. Previous left pneumothorax  appears mostly resolved, with trace residual left apical pneumothorax.  No focal pulmonary consolidation, pleural effusion, or right  pneumothorax. No acute osseous process.       Impression:      Previous left pneumothorax appears mostly resolved, with trace residual  left apical pneumothorax.      This report was finalized on 1/29/2023 5:58 AM by Dr. Elia Becker M.D.       CT Chest Without Contrast Diagnostic [[id number]] Collected: 01/28/23 2213     Updated: 01/28/23 2237    Narrative:      CT chest without contrast     HISTORY: 32-year-old female with left-sided pneumothorax. Assess for  pulmonary blebs.     TECHNIQUE: CT chest with axial imaging from the thoracic inlet to the  upper abdomen without IV contrast     COMPARISON: AP chest x-ray 1/20/2023. No previous chest CT for  comparison.     FINDINGS: There is a left chest drain with pigtail loop anterior to the  lingula. There is a small pneumothorax is demonstrated in the  subpulmonic region courses anterior to the lingula, left upper lobe, and  extends in the left apex. No pulmonary parenchymal abnormality or bleb  is demonstrated. There is no evidence for lung mass or suspicious  nodule. No pleural effusion is evident.     There is no evidence for mediastinal or hilar or axillary lizet  enlargement  evaluation limited without IV contrast. No endotracheal or  central intrabronchial lesion is demonstrated. Imaging through the upper  abdomen appears within normal limits.       Impression:      Small left pneumothorax with left chest tube in place. No pulmonary  parenchymal abnormality or evidence for bleb or lung mass.     Radiation dose reduction techniques were utilized, including automated  exposure control and exposure modulation based on body size.     This report was finalized on 1/28/2023 10:34 PM by Dr. Perico Bai M.D.       XR Chest 1 View [[id number]] Collected: 01/28/23 1043     Updated: 01/28/23 1047    Narrative:      XR CHEST 1 VW-     HISTORY: Female who is 32 years-old,  chest tube placement     TECHNIQUE: Frontal view of the chest     COMPARISON: 01/28/2023     FINDINGS: Left chest tube is seen at the left base. Heart, mediastinum  and pulmonary vasculature are unremarkable. The left lung has  reexpanded, and left pneumothorax is markedly decreased, now measuring  about 2 mm at the apex. No focal pulmonary consolidation, pleural  effusion, or right pneumothorax. No acute osseous process.       Impression:      Chest tube placement with marked decrease of previous left  pneumothorax.     This report was finalized on 1/28/2023 10:44 AM by Dr. Elia Becker M.D.       XR Chest 1 View [[id number]] Collected: 01/28/23 0618     Updated: 01/28/23 0626    Narrative:      XR CHEST 1 VW-     HISTORY: Female who is 32 years-old,  left pneumothorax     TECHNIQUE: Frontal view of the chest     COMPARISON: 01/27/2023     FINDINGS: Heart, mediastinum and pulmonary vasculature are unremarkable.  Previous left pneumothorax shows interval increase, peripherally  measuring as much as 2.1 cm near the base, and 2.4 cm at the upper  hemithorax. At the apex, the pneumothorax measures about 3.5 cm.  Estimated 25-30% left pneumothorax. Mild atelectasis is seen in the left  lung. No pleural effusion, or right  pneumothorax. No acute osseous  process.       Impression:      Further interval increase in left pneumothorax.     Discussed by telephone with patient's nurse, John, at time of  interpretation, 0617, 01/28/2023.     This report was finalized on 1/28/2023 6:23 AM by Dr. Elia Becker M.D.       XR Chest 1 View [[id number]] Collected: 01/27/23 1319     Updated: 01/27/23 1323    Narrative:      XR CHEST 1 VW-     Chemical: Chest tube removal     COMPARISON 1/27/2023 at 0933 hours, current examination 1218 hours     FINDINGS: The left-sided pneumothorax demonstrates interval enlargement.  The left-sided chest tube has been removed. Distance between the pleural  reflection and apex is currently 3.8 cm compared to 2 cm previously.     No mediastinal shift, the right lung remains clear. No pleural fusion.     CONCLUSION: Enlarging left pneumothorax status post chest tube removal.     This report was finalized on 1/27/2023 1:20 PM by Dr. Levon Cole M.D.       XR Chest 1 View [[id number]] Collected: 01/27/23 1013     Updated: 01/27/23 1019    Narrative:      XR CHEST 1 VW-     Clinical: Follow-up left-sided pneumothorax     COMPARISON examination 1/26/2023 at 2056 hours     FINDINGS: Small bore left-sided chest tube is again demonstrated, the  tip has shifted, now directed inferiorly. The left apical pneumothorax  is larger compared to the previous examination. Approximately 6-8% at  this time.     The cardiomediastinal silhouette is stable, the right lung is clear. No  acute airspace disease has developed. The remainder is unremarkable.     This report was finalized on 1/27/2023 10:16 AM by Dr. Levon Cole M.D.       XR Chest 1 View [[id number]] Collected: 01/26/23 2121     Updated: 01/26/23 2126    Narrative:      SINGLE VIEW OF THE CHEST     HISTORY: Chest tube placement     COMPARISON: 01/26/2023     FINDINGS:  Left-sided pleural drainage catheter has been placed, with subsequent  reexpansion of the left  lung. There is a residual tiny left apical  pneumothorax. Heart size is within normal limits. Right lung appears  clear.       Impression:      Interval placement of a left-sided pleural drainage catheter. Residual  tiny left apical pneumothorax noted.     This report was finalized on 1/26/2023 9:23 PM by Dr. Jacinda Halmin M.D.       XR Chest 2 View [[id number]] Collected: 01/26/23 1922     Updated: 01/26/23 1929    Narrative:      XR CHEST 2 VW-     Clinical: Chest and back pain 32-year-old female     FINDINGS: There is a left-sided pneumothorax, estimated size 20%. There  is an apical and basilar component. No significant mediastinal shift.  Heart size within normal limits. No mediastinal or hilar mass. The right  lung is clear.     No pleural effusion nor vascular congestion seen.     CONCLUSION: Left-sided pneumothorax, estimated size 20%.     FINDINGS called Dr. Garza at the time of completion 7:30 PM.     This report was finalized on 1/26/2023 7:26 PM by Dr. Levon Cole M.D.             Pertinent Labs     Results from last 7 days   Lab Units 01/29/23 0400 01/26/23 1958   WBC 10*3/mm3 4.85 5.80   HEMOGLOBIN g/dL 13.1 13.2   PLATELETS 10*3/mm3 283 280     Results from last 7 days   Lab Units 01/29/23 0400 01/26/23 1958   SODIUM mmol/L 140 140   POTASSIUM mmol/L 4.1 4.0   CHLORIDE mmol/L 107 106   CO2 mmol/L 26.0 24.7   BUN mg/dL 7 11   CREATININE mg/dL 0.72 0.77   GLUCOSE mg/dL 97 82   Estimated Creatinine Clearance: 95.4 mL/min (by C-G formula based on SCr of 0.72 mg/dL).  Results from last 7 days   Lab Units 01/26/23 1958   ALBUMIN g/dL 4.6   BILIRUBIN mg/dL 0.2   ALK PHOS U/L 57   AST (SGOT) U/L 15   ALT (SGPT) U/L 14     Results from last 7 days   Lab Units 01/29/23 0400 01/26/23 1958   CALCIUM mg/dL 8.8 9.1   ALBUMIN g/dL  --  4.6               Invalid input(s): LDLCALC        Test Results Pending at Discharge       Discharge Details        Discharge Medications      New Medications       Instructions Start Date   acetaminophen 325 MG tablet  Commonly known as: TYLENOL   650 mg, Oral, Every 4 Hours PRN         Continue These Medications      Instructions Start Date   Qelbree 200 MG capsule sustained-release 24 hr  Generic drug: Viloxazine HCl ER   200 mg, Oral, Daily      traZODone 50 MG tablet  Commonly known as: DESYREL   50 mg, Oral, Nightly             Allergies   Allergen Reactions   • Dilaudid [Hydromorphone Hcl] Myalgia     Patient had tightening in her neck          Discharge Disposition:  Home or Self Care    Discharge Diet:  Diet Order   Procedures   • Diet: Regular/House Diet; Texture: Regular Texture (IDDSI 7); Fluid Consistency: Thin (IDDSI 0)       Discharge Activity:   As tolerated    CODE STATUS:    Code Status and Medical Interventions:   Ordered at: 01/27/23 1833     Code Status (Patient has no pulse and is not breathing):    CPR (Attempt to Resuscitate)     Medical Interventions (Patient has pulse or is breathing):    Full Support       Future Appointments   Date Time Provider Department Center   2/27/2023  2:00 PM Nelson Wheat APRN [facility]     Additional Instructions for the Follow-ups that You Need to Schedule     Discharge Follow-up with Specialty: PCP in 1-2 weeks, Thoracic Surgery in 1 week or as directed   As directed      Specialty: PCP in 1-2 weeks, Thoracic Surgery in 1 week or as directed         XR Chest 2 View   Feb 28, 2023      Exam reason: f/u pneumothorax, left    Pregnant?: Unknown    Release to patient: Routine Release         XR Chest 2 View    Feb 28, 2023 (Approximate)      Exam reason: ptx    Pregnant?: No            Follow-up Information     Provider, No Known .    Contact information:  [place] [id number] [phone]             [facility] THORACIC SURGERY Follow up in 1 month(s).    Specialty: Thoracic Surgery  Contact information:  [facility] Annamaria Loera  [address]  [id number] [phone]  Additional information:  Phone Number: [phone]   Located in the [facility] building           Nelson Wheat APRN Follow up.    Specialties: Thoracic Surgery, Nurse Practitioner  Contact information:  Kiel Loera  [facility] [address]  [place] [address]  [phone]                         Additional Instructions for the Follow-ups that You Need to Schedule     Discharge Follow-up with Specialty: PCP in 1-2 weeks, Thoracic Surgery in 1 week or as directed   As directed      Specialty: PCP in 1-2 weeks, Thoracic Surgery in 1 week or as directed         XR Chest 2 View   Feb 28, 2023      Exam reason: f/u pneumothorax, left    Pregnant?: Unknown    Release to patient: Routine Release         XR Chest 2 View    Feb 28, 2023 (Approximate)      Exam reason: ptx    Pregnant?: No           Time Spent on Discharge:  Greater than 30 minutes      Ronald Soto MD  [facility]  01/31/23  13:17 EST                Electronically signed by Ronald Soto MD at 01/31/23 1023

## 2023-02-07 ENCOUNTER — READMISSION MANAGEMENT (OUTPATIENT)
Dept: CALL CENTER | Facility: HOSPITAL | Age: 33
End: 2023-02-07
Payer: COMMERCIAL

## 2023-02-07 NOTE — OUTREACH NOTE
Medical Week 1 Survey    Flowsheet Row Responses   [facility] patient discharged from? [place]   Does the patient have one of the following disease processes/diagnoses(primary or secondary)? Other  [- 2/6/23]   Week 1 attempt successful? Yes   Call start time 0816   Call end time 0820   Discharge diagnosis **Pneumothorax   Meds reviewed with patient/caregiver? Yes   Is the patient taking all medications as directed (includes completed medication regime)? Yes   Does the patient have a primary care provider?  Yes   Does the patient have an appointment with their PCP within 7 days of discharge? No   What is preventing the patient from scheduling follow up appointments within 7 days of discharge? Haven't had time   Nursing Interventions Educated patient on importance of making appointment, Advised patient to make appointment   Has home health visited the patient within 72 hours of discharge? N/A   Psychosocial issues? No   Did the patient receive a copy of their discharge instructions? Yes   Nursing interventions Reviewed instructions with patient   What is the patient's perception of their health status since discharge? Improving   Is the patient/caregiver able to teach back signs and symptoms related to disease process for when to call PCP? Yes   Is the patient/caregiver able to teach back signs and symptoms related to disease process for when to call 911? Yes   Is the patient/caregiver able to teach back the hierarchy of who to call/visit for symptoms/problems? PCP, Specialist, Home health nurse, Urgent Care, ED, 911 Yes   Week 1 call completed? Yes   Wrap up additional comments Pt reports she went to  yesterday because her chest was tight and hurting. She reports that after some stretching, the pain went away. Pt is doing well at this time.           PRITI KIDD - Registered Nurse

## 2023-02-15 ENCOUNTER — READMISSION MANAGEMENT (OUTPATIENT)
Dept: CALL CENTER | Facility: HOSPITAL | Age: 33
End: 2023-02-15
Payer: COMMERCIAL

## 2023-02-15 NOTE — OUTREACH NOTE
Medical Week 2 Survey    Flowsheet Row Responses   [facility] patient discharged from? [place]   Does the patient have one of the following disease processes/diagnoses(primary or secondary)? Other   Week 2 attempt successful? No   Unsuccessful attempts Attempt 1          Sheryl Blum Registered Nurse

## 2023-02-24 ENCOUNTER — TELEPHONE (OUTPATIENT)
Dept: SURGERY | Facility: CLINIC | Age: 33
End: 2023-02-24
Payer: COMMERCIAL

## 2023-02-24 NOTE — PROGRESS NOTES
"Chief Complaint  Follow-up Pneumothorax     Subjective        Lena Sapp presents to [facility] THORACIC SURGERY  History of Present Illness     Ms. Sapp is a pleasant 32-year-old female who had a left pneumothorax following an acupuncture treatment on 1/26/2023. Chest tube was subsequently placed and was removed on 1/27/2023 after chest imaging demonstrated resolution of the pneumothorax.  Follow-up imaging demonstrated recurrence of the pneumothorax and another chest tube was placed.  The pneumothorax resolved after the second chest tube placement she presents today for follow-up.       Objective   Vital Signs:  /76 (BP Location: Left arm, Patient Position: Sitting, Cuff Size: Adult)   Pulse 76   Ht 165.1 cm (65\")   Wt 54.4 kg (120 lb)   SpO2 99%   BMI 19.97 kg/m²   Estimated body mass index is 19.97 kg/m² as calculated from the following:    Height as of this encounter: 165.1 cm (65\").    Weight as of this encounter: 54.4 kg (120 lb).       BMI is within normal parameters. No other follow-up for BMI required.    Physical Exam   Result Review :      Data reviewed: Radiologic studies Chest x-ray    Lungs are well-expanded bilaterally.  No evidence of recurrent pneumothorax.      Assessment and Plan   Diagnoses and all orders for this visit:    1. Pneumothorax, left (Primary)      Chest x-ray without evidence of pneumothorax recurrence.   We will see her on an as-needed basis should any need arise.       I spent 20 minutes caring for Lena on this date of service. This time includes time spent by me in the following activities:preparing for the visit, counseling and educating the patient/family/caregiver, ordering medications, tests, or procedures and documenting information in the medical record     Follow Up   Return if symptoms worsen or fail to improve.  Patient was given instructions and counseling regarding her condition or for health maintenance advice. Please see " specific information pulled into the AVS if appropriate.

## 2023-02-27 ENCOUNTER — HOSPITAL ENCOUNTER (OUTPATIENT)
Dept: GENERAL RADIOLOGY | Facility: HOSPITAL | Age: 33
Discharge: HOME | End: 2023-02-27
Payer: COMMERCIAL

## 2023-02-27 ENCOUNTER — OFFICE VISIT (OUTPATIENT)
Dept: SURGERY | Facility: CLINIC | Age: 33
End: 2023-02-27
Payer: COMMERCIAL

## 2023-02-27 VITALS
HEIGHT: 65 IN | SYSTOLIC BLOOD PRESSURE: 110 MMHG | HEART RATE: 76 BPM | BODY MASS INDEX: 19.99 KG/M2 | WEIGHT: 120 LBS | OXYGEN SATURATION: 99 % | DIASTOLIC BLOOD PRESSURE: 76 MMHG

## 2023-02-27 DIAGNOSIS — J93.9 PNEUMOTHORAX, LEFT: ICD-10-CM

## 2023-02-27 DIAGNOSIS — J93.9 PNEUMOTHORAX, LEFT: Primary | ICD-10-CM

## 2023-02-27 PROCEDURE — 71046 X-RAY EXAM CHEST 2 VIEWS: CPT

## 2023-02-27 PROCEDURE — 99213 OFFICE O/P EST LOW 20 MIN: CPT

## 2023-09-06 NOTE — PLAN OF CARE
Goal Outcome Evaluation:  Plan of Care Reviewed With: patient     VSS. SR-ST. Chest tube to -20 sxn no air leak. Chest CT completed. Pain treated with prn oxy. Resting well.      Progress: improving      [email or web address]

## 2025-07-28 ENCOUNTER — APPOINTMENT (OUTPATIENT)
Dept: ULTRASOUND IMAGING | Facility: HOSPITAL | Age: 35
End: 2025-07-28
Payer: COMMERCIAL

## 2025-07-28 ENCOUNTER — APPOINTMENT (OUTPATIENT)
Dept: CT IMAGING | Facility: HOSPITAL | Age: 35
End: 2025-07-28
Payer: COMMERCIAL

## 2025-07-28 ENCOUNTER — HOSPITAL ENCOUNTER (EMERGENCY)
Facility: HOSPITAL | Age: 35
Discharge: HOME | End: 2025-07-28
Attending: EMERGENCY MEDICINE | Admitting: EMERGENCY MEDICINE
Payer: COMMERCIAL

## 2025-07-28 VITALS
OXYGEN SATURATION: 99 % | TEMPERATURE: 98.3 F | HEART RATE: 65 BPM | RESPIRATION RATE: 14 BRPM | SYSTOLIC BLOOD PRESSURE: 102 MMHG | DIASTOLIC BLOOD PRESSURE: 72 MMHG

## 2025-07-28 DIAGNOSIS — N83.209 RUPTURED OVARIAN CYST: ICD-10-CM

## 2025-07-28 DIAGNOSIS — R10.31 RIGHT LOWER QUADRANT ABDOMINAL PAIN: Primary | ICD-10-CM

## 2025-07-28 LAB
ALBUMIN SERPL-MCNC: 4.3 G/DL (ref 3.5–5.2)
ALBUMIN/GLOB SERPL: 1.7 G/DL
ALP SERPL-CCNC: 61 U/L (ref 39–117)
ALT SERPL W P-5'-P-CCNC: 10 U/L (ref 1–33)
ANION GAP SERPL CALCULATED.3IONS-SCNC: 11 MMOL/L (ref 5–15)
AST SERPL-CCNC: 13 U/L (ref 1–32)
BACTERIA UR QL AUTO: ABNORMAL /HPF
BASOPHILS # BLD AUTO: 0.04 10*3/MM3 (ref 0–0.2)
BASOPHILS NFR BLD AUTO: 0.9 % (ref 0–1.5)
BILIRUB SERPL-MCNC: 0.3 MG/DL (ref 0–1.2)
BILIRUB UR QL STRIP: NEGATIVE
BUN SERPL-MCNC: 7 MG/DL (ref 6–20)
BUN/CREAT SERPL: 9.7 (ref 7–25)
CALCIUM SPEC-SCNC: 9 MG/DL (ref 8.6–10.5)
CHLORIDE SERPL-SCNC: 107 MMOL/L (ref 98–107)
CLARITY UR: ABNORMAL
CO2 SERPL-SCNC: 24 MMOL/L (ref 22–29)
COLOR UR: ABNORMAL
CREAT SERPL-MCNC: 0.72 MG/DL (ref 0.57–1)
DEPRECATED RDW RBC AUTO: 41.4 FL (ref 37–54)
EGFRCR SERPLBLD CKD-EPI 2021: 112 ML/MIN/1.73
EOSINOPHIL # BLD AUTO: 0.08 10*3/MM3 (ref 0–0.4)
EOSINOPHIL NFR BLD AUTO: 1.8 % (ref 0.3–6.2)
ERYTHROCYTE [DISTWIDTH] IN BLOOD BY AUTOMATED COUNT: 11.6 % (ref 12.3–15.4)
GLOBULIN UR ELPH-MCNC: 2.6 GM/DL
GLUCOSE SERPL-MCNC: 94 MG/DL (ref 65–99)
GLUCOSE UR STRIP-MCNC: NEGATIVE MG/DL
HCG SERPL QL: NEGATIVE
HCT VFR BLD AUTO: 38.9 % (ref 34–46.6)
HGB BLD-MCNC: 12.8 G/DL (ref 12–15.9)
HGB UR QL STRIP.AUTO: ABNORMAL
HOLD SPECIMEN: NORMAL
HYALINE CASTS UR QL AUTO: ABNORMAL /LPF
IMM GRANULOCYTES # BLD AUTO: 0.01 10*3/MM3 (ref 0–0.05)
IMM GRANULOCYTES NFR BLD AUTO: 0.2 % (ref 0–0.5)
KETONES UR QL STRIP: ABNORMAL
LEUKOCYTE ESTERASE UR QL STRIP.AUTO: ABNORMAL
LIPASE SERPL-CCNC: 33 U/L (ref 13–60)
LYMPHOCYTES # BLD AUTO: 1.45 10*3/MM3 (ref 0.7–3.1)
LYMPHOCYTES NFR BLD AUTO: 32.7 % (ref 19.6–45.3)
MCH RBC QN AUTO: 31.8 PG (ref 26.6–33)
MCHC RBC AUTO-ENTMCNC: 32.9 G/DL (ref 31.5–35.7)
MCV RBC AUTO: 96.8 FL (ref 79–97)
MONOCYTES # BLD AUTO: 0.41 10*3/MM3 (ref 0.1–0.9)
MONOCYTES NFR BLD AUTO: 9.3 % (ref 5–12)
NEUTROPHILS NFR BLD AUTO: 2.44 10*3/MM3 (ref 1.7–7)
NEUTROPHILS NFR BLD AUTO: 55.1 % (ref 42.7–76)
NITRITE UR QL STRIP: NEGATIVE
NRBC BLD AUTO-RTO: 0 /100 WBC (ref 0–0.2)
PH UR STRIP.AUTO: 6 [PH] (ref 5–8)
PLATELET # BLD AUTO: 279 10*3/MM3 (ref 140–450)
PMV BLD AUTO: 10.1 FL (ref 6–12)
POTASSIUM SERPL-SCNC: 4.4 MMOL/L (ref 3.5–5.2)
PROT SERPL-MCNC: 6.9 G/DL (ref 6–8.5)
PROT UR QL STRIP: ABNORMAL
RBC # BLD AUTO: 4.02 10*6/MM3 (ref 3.77–5.28)
RBC # UR STRIP: ABNORMAL /HPF
REF LAB TEST METHOD: ABNORMAL
SODIUM SERPL-SCNC: 142 MMOL/L (ref 136–145)
SP GR UR STRIP: >1.03 (ref 1–1.03)
SQUAMOUS #/AREA URNS HPF: ABNORMAL /HPF
UROBILINOGEN UR QL STRIP: ABNORMAL
WBC # UR STRIP: ABNORMAL /HPF
WBC NRBC COR # BLD AUTO: 4.43 10*3/MM3 (ref 3.4–10.8)
WHOLE BLOOD HOLD COAG: NORMAL
WHOLE BLOOD HOLD SPECIMEN: NORMAL

## 2025-07-28 PROCEDURE — 76830 TRANSVAGINAL US NON-OB: CPT

## 2025-07-28 PROCEDURE — 81001 URINALYSIS AUTO W/SCOPE: CPT | Performed by: EMERGENCY MEDICINE

## 2025-07-28 PROCEDURE — 80053 COMPREHEN METABOLIC PANEL: CPT

## 2025-07-28 PROCEDURE — 93976 VASCULAR STUDY: CPT

## 2025-07-28 PROCEDURE — 83690 ASSAY OF LIPASE: CPT

## 2025-07-28 PROCEDURE — 25510000001 IOPAMIDOL 61 % SOLUTION: Performed by: EMERGENCY MEDICINE

## 2025-07-28 PROCEDURE — 74177 CT ABD & PELVIS W/CONTRAST: CPT

## 2025-07-28 PROCEDURE — 85025 COMPLETE CBC W/AUTO DIFF WBC: CPT | Performed by: EMERGENCY MEDICINE

## 2025-07-28 PROCEDURE — 99285 EMERGENCY DEPT VISIT HI MDM: CPT

## 2025-07-28 PROCEDURE — 84703 CHORIONIC GONADOTROPIN ASSAY: CPT | Performed by: EMERGENCY MEDICINE

## 2025-07-28 PROCEDURE — 36415 COLL VENOUS BLD VENIPUNCTURE: CPT | Performed by: EMERGENCY MEDICINE

## 2025-07-28 PROCEDURE — 76856 US EXAM PELVIC COMPLETE: CPT

## 2025-07-28 RX ORDER — SODIUM CHLORIDE 0.9 % (FLUSH) 0.9 %
10 SYRINGE (ML) INJECTION AS NEEDED
Status: DISCONTINUED | OUTPATIENT
Start: 2025-07-28 | End: 2025-07-28 | Stop reason: HOSPADM

## 2025-07-28 RX ORDER — IOPAMIDOL 612 MG/ML
100 INJECTION, SOLUTION INTRAVASCULAR
Status: COMPLETED | OUTPATIENT
Start: 2025-07-28 | End: 2025-07-28

## 2025-07-28 RX ADMIN — IOPAMIDOL 85 ML: 612 INJECTION, SOLUTION INTRAVENOUS at 11:35

## 2025-07-28 NOTE — ED PROVIDER NOTES
EMERGENCY DEPARTMENT ENCOUNTER    Room Number:  20/20  PCP: Sangeeta Grewal APRN  Historian: Patient      HPI:  Chief Complaint: Abdominal pain  A complete HPI/ROS/PMH/PSH/SH/FH are unobtainable due to: None  Context: Lena Sapp is a 34 y.o. female who presents to the ED c/o sudden onset of right lower quadrant/right sided pelvic abdominal discomfort that began yesterday evening.  She states that the pain is fairly localized to the right lower quadrant but occasionally radiates into her right back/flank.  She states that the pain was made worse with movement and quite severe in intensity at its max.  Currently, she states that it is improved significantly and is mild in intensity.  She denies previous episodes of similar symptoms, nausea/vomiting, fever/chills, dysuria/hematuria, or diarrhea/constipation.            PAST MEDICAL HISTORY  Active Ambulatory Problems     Diagnosis Date Noted    Pneumothorax, left 01/26/2023    Insomnia 01/27/2023    Pleuritic chest pain 01/27/2023    Drug-induced constipation 01/30/2023     Resolved Ambulatory Problems     Diagnosis Date Noted    No Resolved Ambulatory Problems     Past Medical History:   Diagnosis Date    Kyphosis          PAST SURGICAL HISTORY  Past Surgical History:   Procedure Laterality Date    CYST REMOVAL      uterine cyst         FAMILY HISTORY  No family history on file.      SOCIAL HISTORY  Social History     Socioeconomic History    Marital status: Single   Tobacco Use    Smoking status: Never     Passive exposure: Never    Smokeless tobacco: Never   Vaping Use    Vaping status: Never Used   Substance and Sexual Activity    Alcohol use: Never    Drug use: Never         ALLERGIES  Dilaudid [hydromorphone hcl]        REVIEW OF SYSTEMS  Review of Systems   Constitutional:  Negative for fever.   HENT:  Negative for sore throat.    Eyes: Negative.    Respiratory:  Negative for cough and shortness of breath.    Cardiovascular:  Negative for chest pain.    Gastrointestinal:  Positive for abdominal pain. Negative for diarrhea and vomiting.   Genitourinary:  Positive for flank pain. Negative for dysuria.   Musculoskeletal:  Negative for neck pain.   Skin:  Negative for rash.   Allergic/Immunologic: Negative.    Neurological:  Negative for weakness, numbness and headaches.   Hematological: Negative.    Psychiatric/Behavioral: Negative.     All other systems reviewed and are negative.         PHYSICAL EXAM  ED Triage Vitals   Temp Heart Rate Resp BP SpO2   07/28/25 0829 07/28/25 0829 07/28/25 0829 07/28/25 0830 07/28/25 0829   98.1 °F (36.7 °C) 76 16 101/80 99 %      Temp src Heart Rate Source Patient Position BP Location FiO2 (%)   07/28/25 0829 -- 07/28/25 0830 07/28/25 0830 --   Oral  Sitting Right arm        Physical Exam  Constitutional:       General: She is not in acute distress.     Appearance: Normal appearance. She is not ill-appearing or toxic-appearing.   HENT:      Head: Normocephalic and atraumatic.   Eyes:      Extraocular Movements: Extraocular movements intact.      Pupils: Pupils are equal, round, and reactive to light.   Cardiovascular:      Rate and Rhythm: Normal rate and regular rhythm.      Heart sounds: No murmur heard.     No friction rub. No gallop.   Pulmonary:      Effort: Pulmonary effort is normal.      Breath sounds: Normal breath sounds.   Abdominal:      General: Abdomen is flat. There is no distension.      Palpations: Abdomen is soft.      Tenderness: There is no abdominal tenderness.   Musculoskeletal:         General: No swelling or tenderness. Normal range of motion.      Cervical back: Normal range of motion and neck supple.   Skin:     General: Skin is warm and dry.   Neurological:      General: No focal deficit present.      Mental Status: She is alert and oriented to person, place, and time.      Sensory: No sensory deficit.      Motor: No weakness.   Psychiatric:         Mood and Affect: Mood normal.         Behavior: Behavior  normal.           Vital signs and nursing notes reviewed.          LAB RESULTS  Recent Results (from the past 24 hours)   Comprehensive Metabolic Panel    Collection Time: 07/28/25  9:00 AM    Specimen: Arm, Right; Blood   Result Value Ref Range    Glucose 94 65 - 99 mg/dL    BUN 7.0 6.0 - 20.0 mg/dL    Creatinine 0.72 0.57 - 1.00 mg/dL    Sodium 142 136 - 145 mmol/L    Potassium 4.4 3.5 - 5.2 mmol/L    Chloride 107 98 - 107 mmol/L    CO2 24.0 22.0 - 29.0 mmol/L    Calcium 9.0 8.6 - 10.5 mg/dL    Total Protein 6.9 6.0 - 8.5 g/dL    Albumin 4.3 3.5 - 5.2 g/dL    ALT (SGPT) 10 1 - 33 U/L    AST (SGOT) 13 1 - 32 U/L    Alkaline Phosphatase 61 39 - 117 U/L    Total Bilirubin 0.3 0.0 - 1.2 mg/dL    Globulin 2.6 gm/dL    A/G Ratio 1.7 g/dL    BUN/Creatinine Ratio 9.7 7.0 - 25.0    Anion Gap 11.0 5.0 - 15.0 mmol/L    eGFR 112.0 >60.0 mL/min/1.73   Lipase    Collection Time: 07/28/25  9:00 AM    Specimen: Arm, Right; Blood   Result Value Ref Range    Lipase 33 13 - 60 U/L   hCG, Serum, Qualitative    Collection Time: 07/28/25  9:00 AM    Specimen: Arm, Right; Blood   Result Value Ref Range    HCG Qualitative Negative Negative   Green Top (Gel)    Collection Time: 07/28/25  9:00 AM   Result Value Ref Range    Extra Tube Hold for add-ons.    Lavender Top    Collection Time: 07/28/25  9:00 AM   Result Value Ref Range    Extra Tube hold for add-on    Light Blue Top    Collection Time: 07/28/25  9:00 AM   Result Value Ref Range    Extra Tube Hold for add-ons.    CBC Auto Differential    Collection Time: 07/28/25  9:00 AM    Specimen: Arm, Right; Blood   Result Value Ref Range    WBC 4.43 3.40 - 10.80 10*3/mm3    RBC 4.02 3.77 - 5.28 10*6/mm3    Hemoglobin 12.8 12.0 - 15.9 g/dL    Hematocrit 38.9 34.0 - 46.6 %    MCV 96.8 79.0 - 97.0 fL    MCH 31.8 26.6 - 33.0 pg    MCHC 32.9 31.5 - 35.7 g/dL    RDW 11.6 (L) 12.3 - 15.4 %    RDW-SD 41.4 37.0 - 54.0 fl    MPV 10.1 6.0 - 12.0 fL    Platelets 279 140 - 450 10*3/mm3    Neutrophil %  55.1 42.7 - 76.0 %    Lymphocyte % 32.7 19.6 - 45.3 %    Monocyte % 9.3 5.0 - 12.0 %    Eosinophil % 1.8 0.3 - 6.2 %    Basophil % 0.9 0.0 - 1.5 %    Immature Grans % 0.2 0.0 - 0.5 %    Neutrophils, Absolute 2.44 1.70 - 7.00 10*3/mm3    Lymphocytes, Absolute 1.45 0.70 - 3.10 10*3/mm3    Monocytes, Absolute 0.41 0.10 - 0.90 10*3/mm3    Eosinophils, Absolute 0.08 0.00 - 0.40 10*3/mm3    Basophils, Absolute 0.04 0.00 - 0.20 10*3/mm3    Immature Grans, Absolute 0.01 0.00 - 0.05 10*3/mm3    nRBC 0.0 0.0 - 0.2 /100 WBC   Urinalysis With Microscopic If Indicated (No Culture) - Urine, Clean Catch    Collection Time: 07/28/25  9:04 AM    Specimen: Urine, Clean Catch   Result Value Ref Range    Color, UA Dark Yellow (A) Yellow, Straw    Appearance, UA Cloudy (A) Clear    pH, UA 6.0 5.0 - 8.0    Specific Gravity, UA >1.030 (H) 1.005 - 1.030    Glucose, UA Negative Negative    Ketones, UA Trace (A) Negative    Bilirubin, UA Negative Negative    Blood, UA Large (3+) (A) Negative    Protein, UA 30 mg/dL (1+) (A) Negative    Leuk Esterase, UA Trace (A) Negative    Nitrite, UA Negative Negative    Urobilinogen, UA 1.0 E.U./dL 0.2 - 1.0 E.U./dL   Urinalysis, Microscopic Only - Urine, Clean Catch    Collection Time: 07/28/25  9:04 AM    Specimen: Urine, Clean Catch   Result Value Ref Range    RBC, UA Too Numerous to Count (A) None Seen, 0-2 /HPF    WBC, UA 3-5 (A) None Seen, 0-2 /HPF    Bacteria, UA Trace (A) None Seen /HPF    Squamous Epithelial Cells, UA 7-12 (A) None Seen, 0-2 /HPF    Hyaline Casts, UA 7-12 None Seen /LPF    Methodology Automated Microscopy        Ordered the above labs and reviewed the results.        RADIOLOGY  US Pelvis Complete  Result Date: 7/28/2025  US PELVIS  HISTORY: Right pelvic pain  COMPARISON: Same day CT abdomen/pelvis  TECHNIQUE: Gray-scale transabdominal/transvaginal ultrasound with color and spectral doppler.  FINDINGS:  UTERUS: Normal echogenicity. 1.3 cm hypoechoic lesion within the uterine  fundus, likely aN intramural fibroid. ENDOMETRIUM: Normal endometrial thickness.  OVARIES: No suspicious lesions. Normal vascularity.  OTHER: Small volume free fluid within the pelvis.      Small volume free fluid within the pelvis, asymmetric on the right is favored to be physiologic. However, given history of right-sided pelvic pain, this could be sequela of a ruptured ovarian cyst.  This report was finalized on 7/28/2025 1:13 PM by Dr. Brenden Brooks M.D on Workstation: [id number]      CT Abdomen Pelvis With Contrast  Result Date: 7/28/2025  CT ABDOMEN PELVIS W CONTRAST-  INDICATION: Right lower quadrant pain.  COMPARISON: None  TECHNIQUE: Routine CT abdomen/pelvis with IV contrast. Coronal and sagittal reformats. Radiation dose reduction techniques were utilized, including automated exposure control and exposure modulation based on body size.  FINDINGS:  Lung bases: Unremarkable.  ABDOMEN: Low attenuating lesion seen in segment 8 of the right hepatic lobe, adjacent to the capsule, measures less than 1 cm and is too small to characterize, often biliary cystic hamartoma. Normal gallbladder. No biliary ductal dilatation. Spleen is normal in size. No pancreatic mass or pancreatic ductal dilatation seen. No adrenal nodules. No renal mass or hydronephrosis.  Pelvis: Underdistended bladder. No bladder calculus. Anteverted uterus. No adnexal mass. Small amount of free fluid seen in the cul-de-sac, within physiologic limits. Phleboliths in the pelvis.  Bowel: No small bowel obstruction. Normal appendix.  Abdominal wall: Left flank piercing.  Retroperitoneum: No lymphadenopathy.  Vasculature: Patent. No abdominal aortic aneurysm.  Osseous structures: No destructive osseous lesions.      No acute findings identified in the abdomen or pelvis.  This report was finalized on 7/28/2025 12:00 PM by Dr. Jesus Taylor M.D on Workstation: [id number]        Ordered the above noted radiological studies. Reviewed by me in PACS.             PROCEDURES  Procedures          MEDICATIONS GIVEN IN ER  Medications   sodium chloride 0.9 % flush 10 mL (has no administration in time range)   iopamidol (ISOVUE-300) 61 % injection 100 mL (85 mL Intravenous Given by Other 7/28/25 1135)                   MEDICAL DECISION MAKING, PROGRESS, and CONSULTS    All labs have been independently reviewed by me.  All radiology studies have been reviewed by me and I have also reviewed the radiology report.   EKGs independently viewed and interpreted by me.  Discussion below represents my analysis of pertinent findings related to patient's condition, differential diagnosis, treatment plan and final disposition.      Additional sources:  - Discussed/ obtained information from independent historians: History obtained from the patient herself at bedside.    - External (non-ED) record review: Upon medical records review, the patient was admitted to the hospital last from 1/26/2023 through 1/31/2023 where she was treated for a left-sided pneumothorax.    - Chronic or social conditions impacting care: None reported    - Shared decision making: Discharge decision based on shared conversations have between myself as well as the patient and patient's family at bedside.      Orders placed during this visit:  Orders Placed This Encounter   Procedures    CT Abdomen Pelvis With Contrast    US Non-ob Transvaginal    US Pelvis Complete    US Testicular or Ovarian Vascular Limited    [place] Draw    Comprehensive Metabolic Panel    Lipase    Urinalysis With Microscopic If Indicated (No Culture) - Urine, Clean Catch    hCG, Serum, Qualitative    CBC Auto Differential    Urinalysis, Microscopic Only - Urine, Clean Catch    NPO Diet NPO Type: Strict NPO    Undress & Gown    Insert Peripheral IV    CBC & Differential    Green Top (Gel)    Lavender Top    Light Blue Top             Differential diagnosis includes but is not limited to:    Acute pyelonephritis, ureterolithiasis, renal  colic, acute cystitis, acute appendicitis, acute diverticulitis, musculoskeletal pain, ovarian cyst, or ovarian torsion      Independent interpretation of labs, radiology studies, and discussions with consultants:    CT abdomen and pelvis independently interpreted by myself with my interpretation showing no ureterolithiasis, evidence of appendicitis, or free air.      ED Course as of 07/28/25 1613   Mon Jul 28, 2025   1353 On reevaluation, the patient is resting comfortably and without any further complaints.  She continues to state that her pain has markedly improved and is very minimal in intensity currently.  I did inform her that it is most likely a ruptured ovarian cyst that caused her pain but I do suspect that her pain should continue to get better better.  She does have an OB/GYN and I recommended very close follow-up for any further management.  Return instructions given and she is stable for discharge.  All questions answered. [BM]      ED Course User Index  [BM] Mushtaq Tapia MD         DIAGNOSIS  Final diagnoses:   Right lower quadrant abdominal pain   Ruptured ovarian cyst         DISPOSITION  DISCHARGE    Patient discharged in stable condition.    Reviewed implications of results, diagnosis, meds, responsibility to follow up, warning signs and symptoms of possible worsening, potential complications and reasons to return to ER.    Patient/Family voiced understanding of above instructions.    Discussed plan for discharge, as there is no emergent indication for admission. Patient referred to primary care provider for BP management due to today's BP. Pt/family is agreeable and understands need for follow up and repeat testing.  Pt is aware that discharge does not mean that nothing is wrong but it indicates no emergency is present that requires admission and they must continue care with follow-up as given below or physician of their choice.     FOLLOW-UP  Sangeeta Grewal, APRN  [address] [facility]  [place] [place] [address]  [phone]    Schedule an appointment as soon as possible for a visit            Medication List      No changes were made to your prescriptions during this visit.                   Latest Documented Vital Signs:  As of 16:13 EDT  BP- 102/72 HR- 65 Temp- 98.3 °F (36.8 °C) (Tympanic) O2 sat- 99%              --    Please note that portions of this were completed with a voice recognition program.       Note Disclaimer: At [facility], we believe that sharing information builds trust and better relationships. You are receiving this note because you are receiving care at [facility] or recently visited. It is possible you will see health information before a provider has talked with you about it. This kind of information can be easy to misunderstand. To help you fully understand what it means for your health, we urge you to discuss this note with your provider.             Mushtaq Tapia MD  07/28/25 9468

## 2025-07-28 NOTE — DISCHARGE INSTRUCTIONS
Return to the emergency room immediately should your pain return or worsen in any way or for any other concerns.  Otherwise, follow-up closely with your OB/GYN for any further testing or treatment deemed necessary.